# Patient Record
Sex: MALE | Race: WHITE | NOT HISPANIC OR LATINO | Employment: OTHER | URBAN - METROPOLITAN AREA
[De-identification: names, ages, dates, MRNs, and addresses within clinical notes are randomized per-mention and may not be internally consistent; named-entity substitution may affect disease eponyms.]

---

## 2021-06-02 ENCOUNTER — RECORDS - HEALTHEAST (OUTPATIENT)
Dept: ADMINISTRATIVE | Facility: CLINIC | Age: 81
End: 2021-06-02

## 2022-03-28 ENCOUNTER — TRANSCRIBE ORDERS (OUTPATIENT)
Dept: OTHER | Age: 82
End: 2022-03-28

## 2022-03-28 DIAGNOSIS — I50.9 CONGESTIVE HEART FAILURE, UNSPECIFIED HF CHRONICITY, UNSPECIFIED HEART FAILURE TYPE (H): Primary | ICD-10-CM

## 2023-03-29 ENCOUNTER — APPOINTMENT (OUTPATIENT)
Dept: GENERAL RADIOLOGY | Facility: CLINIC | Age: 83
DRG: 521 | End: 2023-03-29
Attending: EMERGENCY MEDICINE
Payer: COMMERCIAL

## 2023-03-29 ENCOUNTER — HOSPITAL ENCOUNTER (INPATIENT)
Facility: CLINIC | Age: 83
LOS: 2 days | Discharge: HOME | DRG: 521 | End: 2023-03-31
Attending: EMERGENCY MEDICINE | Admitting: FAMILY MEDICINE
Payer: COMMERCIAL

## 2023-03-29 ENCOUNTER — APPOINTMENT (OUTPATIENT)
Dept: GENERAL RADIOLOGY | Facility: CLINIC | Age: 83
DRG: 521 | End: 2023-03-29
Attending: FAMILY MEDICINE
Payer: COMMERCIAL
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
## 2023-03-29 DIAGNOSIS — S72.001A CLOSED DISPLACED FRACTURE OF RIGHT FEMORAL NECK (H): ICD-10-CM

## 2023-03-29 PROBLEM — I42.8 NICM (NONISCHEMIC CARDIOMYOPATHY) (H): Status: ACTIVE | Noted: 2022-01-10

## 2023-03-29 PROBLEM — I48.0 PAROXYSMAL ATRIAL FIBRILLATION WITH RAPID VENTRICULAR RESPONSE (H): Status: ACTIVE | Noted: 2020-07-31

## 2023-03-29 PROBLEM — I77.810 ASCENDING AORTA DILATATION (H): Status: ACTIVE | Noted: 2020-06-23

## 2023-03-29 LAB
ANION GAP SERPL CALCULATED.3IONS-SCNC: 12 MMOL/L (ref 7–15)
ANION GAP SERPL CALCULATED.3IONS-SCNC: 12 MMOL/L (ref 7–15)
BASOPHILS # BLD AUTO: 0 10E3/UL (ref 0–0.2)
BASOPHILS # BLD AUTO: 0 10E3/UL (ref 0–0.2)
BASOPHILS NFR BLD AUTO: 0 %
BASOPHILS NFR BLD AUTO: 0 %
BUN SERPL-MCNC: 34.3 MG/DL (ref 8–23)
BUN SERPL-MCNC: 34.5 MG/DL (ref 8–23)
CALCIUM SERPL-MCNC: 9.2 MG/DL (ref 8.8–10.2)
CALCIUM SERPL-MCNC: 9.4 MG/DL (ref 8.8–10.2)
CHLORIDE SERPL-SCNC: 104 MMOL/L (ref 98–107)
CHLORIDE SERPL-SCNC: 107 MMOL/L (ref 98–107)
CREAT SERPL-MCNC: 1.7 MG/DL (ref 0.67–1.17)
CREAT SERPL-MCNC: 1.8 MG/DL (ref 0.67–1.17)
DEPRECATED HCO3 PLAS-SCNC: 21 MMOL/L (ref 22–29)
DEPRECATED HCO3 PLAS-SCNC: 24 MMOL/L (ref 22–29)
EOSINOPHIL # BLD AUTO: 0.1 10E3/UL (ref 0–0.7)
EOSINOPHIL # BLD AUTO: 0.1 10E3/UL (ref 0–0.7)
EOSINOPHIL NFR BLD AUTO: 1 %
EOSINOPHIL NFR BLD AUTO: 1 %
ERYTHROCYTE [DISTWIDTH] IN BLOOD BY AUTOMATED COUNT: 14.1 % (ref 10–15)
ERYTHROCYTE [DISTWIDTH] IN BLOOD BY AUTOMATED COUNT: 14.2 % (ref 10–15)
GFR SERPL CREATININE-BSD FRML MDRD: 38 ML/MIN/1.73M2
GFR SERPL CREATININE-BSD FRML MDRD: 40 ML/MIN/1.73M2
GLUCOSE SERPL-MCNC: 106 MG/DL (ref 70–99)
GLUCOSE SERPL-MCNC: 114 MG/DL (ref 70–99)
HCT VFR BLD AUTO: 47.5 % (ref 40–53)
HCT VFR BLD AUTO: 49.4 % (ref 40–53)
HGB BLD-MCNC: 16.1 G/DL (ref 13.3–17.7)
HGB BLD-MCNC: 16.5 G/DL (ref 13.3–17.7)
IMM GRANULOCYTES # BLD: 0 10E3/UL
IMM GRANULOCYTES # BLD: 0.1 10E3/UL
IMM GRANULOCYTES NFR BLD: 0 %
IMM GRANULOCYTES NFR BLD: 1 %
INR PPP: 1.19 (ref 0.85–1.15)
LYMPHOCYTES # BLD AUTO: 0.8 10E3/UL (ref 0.8–5.3)
LYMPHOCYTES # BLD AUTO: 1 10E3/UL (ref 0.8–5.3)
LYMPHOCYTES NFR BLD AUTO: 10 %
LYMPHOCYTES NFR BLD AUTO: 8 %
MCH RBC QN AUTO: 30.6 PG (ref 26.5–33)
MCH RBC QN AUTO: 30.6 PG (ref 26.5–33)
MCHC RBC AUTO-ENTMCNC: 33.4 G/DL (ref 31.5–36.5)
MCHC RBC AUTO-ENTMCNC: 33.9 G/DL (ref 31.5–36.5)
MCV RBC AUTO: 90 FL (ref 78–100)
MCV RBC AUTO: 92 FL (ref 78–100)
MONOCYTES # BLD AUTO: 0.6 10E3/UL (ref 0–1.3)
MONOCYTES # BLD AUTO: 0.7 10E3/UL (ref 0–1.3)
MONOCYTES NFR BLD AUTO: 6 %
MONOCYTES NFR BLD AUTO: 7 %
NEUTROPHILS # BLD AUTO: 8.5 10E3/UL (ref 1.6–8.3)
NEUTROPHILS # BLD AUTO: 8.7 10E3/UL (ref 1.6–8.3)
NEUTROPHILS NFR BLD AUTO: 82 %
NEUTROPHILS NFR BLD AUTO: 84 %
NRBC # BLD AUTO: 0 10E3/UL
NRBC # BLD AUTO: 0 10E3/UL
NRBC BLD AUTO-RTO: 0 /100
NRBC BLD AUTO-RTO: 0 /100
PLATELET # BLD AUTO: 129 10E3/UL (ref 150–450)
PLATELET # BLD AUTO: 130 10E3/UL (ref 150–450)
POTASSIUM SERPL-SCNC: 4.8 MMOL/L (ref 3.4–5.3)
POTASSIUM SERPL-SCNC: 5 MMOL/L (ref 3.4–5.3)
RBC # BLD AUTO: 5.27 10E6/UL (ref 4.4–5.9)
RBC # BLD AUTO: 5.39 10E6/UL (ref 4.4–5.9)
SODIUM SERPL-SCNC: 140 MMOL/L (ref 136–145)
SODIUM SERPL-SCNC: 140 MMOL/L (ref 136–145)
WBC # BLD AUTO: 10.3 10E3/UL (ref 4–11)
WBC # BLD AUTO: 10.3 10E3/UL (ref 4–11)

## 2023-03-29 PROCEDURE — 85610 PROTHROMBIN TIME: CPT | Performed by: EMERGENCY MEDICINE

## 2023-03-29 PROCEDURE — 99285 EMERGENCY DEPT VISIT HI MDM: CPT | Performed by: EMERGENCY MEDICINE

## 2023-03-29 PROCEDURE — 71045 X-RAY EXAM CHEST 1 VIEW: CPT

## 2023-03-29 PROCEDURE — 36415 COLL VENOUS BLD VENIPUNCTURE: CPT | Performed by: EMERGENCY MEDICINE

## 2023-03-29 PROCEDURE — 99285 EMERGENCY DEPT VISIT HI MDM: CPT | Mod: 25 | Performed by: EMERGENCY MEDICINE

## 2023-03-29 PROCEDURE — 120N000001 HC R&B MED SURG/OB

## 2023-03-29 PROCEDURE — 258N000003 HC RX IP 258 OP 636: Performed by: FAMILY MEDICINE

## 2023-03-29 PROCEDURE — 73502 X-RAY EXAM HIP UNI 2-3 VIEWS: CPT

## 2023-03-29 PROCEDURE — 80048 BASIC METABOLIC PNL TOTAL CA: CPT | Performed by: EMERGENCY MEDICINE

## 2023-03-29 PROCEDURE — 250N000013 HC RX MED GY IP 250 OP 250 PS 637: Performed by: FAMILY MEDICINE

## 2023-03-29 PROCEDURE — 85004 AUTOMATED DIFF WBC COUNT: CPT | Performed by: EMERGENCY MEDICINE

## 2023-03-29 PROCEDURE — 96375 TX/PRO/DX INJ NEW DRUG ADDON: CPT | Performed by: EMERGENCY MEDICINE

## 2023-03-29 PROCEDURE — 250N000011 HC RX IP 250 OP 636: Performed by: EMERGENCY MEDICINE

## 2023-03-29 PROCEDURE — 96374 THER/PROPH/DIAG INJ IV PUSH: CPT | Performed by: EMERGENCY MEDICINE

## 2023-03-29 PROCEDURE — 99222 1ST HOSP IP/OBS MODERATE 55: CPT | Mod: AI | Performed by: FAMILY MEDICINE

## 2023-03-29 RX ORDER — NALOXONE HYDROCHLORIDE 0.4 MG/ML
0.4 INJECTION, SOLUTION INTRAMUSCULAR; INTRAVENOUS; SUBCUTANEOUS
Status: DISCONTINUED | OUTPATIENT
Start: 2023-03-29 | End: 2023-03-31 | Stop reason: HOSPADM

## 2023-03-29 RX ORDER — ONDANSETRON 2 MG/ML
4 INJECTION INTRAMUSCULAR; INTRAVENOUS EVERY 6 HOURS PRN
Status: DISCONTINUED | OUTPATIENT
Start: 2023-03-29 | End: 2023-03-30

## 2023-03-29 RX ORDER — ONDANSETRON 4 MG/1
4 TABLET, ORALLY DISINTEGRATING ORAL EVERY 6 HOURS PRN
Status: DISCONTINUED | OUTPATIENT
Start: 2023-03-29 | End: 2023-03-30

## 2023-03-29 RX ORDER — METOPROLOL SUCCINATE 25 MG/1
1 TABLET, EXTENDED RELEASE ORAL DAILY
COMMUNITY
Start: 2023-01-27

## 2023-03-29 RX ORDER — HYDROMORPHONE HYDROCHLORIDE 1 MG/ML
.3-.5 INJECTION, SOLUTION INTRAMUSCULAR; INTRAVENOUS; SUBCUTANEOUS
Status: DISCONTINUED | OUTPATIENT
Start: 2023-03-29 | End: 2023-03-30 | Stop reason: DRUGHIGH

## 2023-03-29 RX ORDER — CYCLOBENZAPRINE HCL 5 MG
5 TABLET ORAL 3 TIMES DAILY PRN
Status: DISCONTINUED | OUTPATIENT
Start: 2023-03-29 | End: 2023-03-31 | Stop reason: HOSPADM

## 2023-03-29 RX ORDER — MULTIVITAMIN WITH IRON
250 TABLET ORAL DAILY
COMMUNITY

## 2023-03-29 RX ORDER — MULTIVITAMIN,THERAPEUTIC
1 TABLET ORAL DAILY
Status: DISCONTINUED | OUTPATIENT
Start: 2023-03-30 | End: 2023-03-31 | Stop reason: HOSPADM

## 2023-03-29 RX ORDER — NALOXONE HYDROCHLORIDE 0.4 MG/ML
0.2 INJECTION, SOLUTION INTRAMUSCULAR; INTRAVENOUS; SUBCUTANEOUS
Status: DISCONTINUED | OUTPATIENT
Start: 2023-03-29 | End: 2023-03-31 | Stop reason: HOSPADM

## 2023-03-29 RX ORDER — ACETAMINOPHEN 325 MG/1
650 TABLET ORAL EVERY 6 HOURS PRN
Status: DISCONTINUED | OUTPATIENT
Start: 2023-03-29 | End: 2023-03-30 | Stop reason: DRUGHIGH

## 2023-03-29 RX ORDER — LEVOCARNITINE 1 G/10ML
500 SOLUTION ORAL DAILY
Status: DISCONTINUED | OUTPATIENT
Start: 2023-03-30 | End: 2023-03-31 | Stop reason: HOSPADM

## 2023-03-29 RX ORDER — DIAZEPAM 10 MG/2ML
2.5 INJECTION, SOLUTION INTRAMUSCULAR; INTRAVENOUS ONCE
Status: COMPLETED | OUTPATIENT
Start: 2023-03-29 | End: 2023-03-29

## 2023-03-29 RX ORDER — SACUBITRIL AND VALSARTAN 24; 26 MG/1; MG/1
1 TABLET, FILM COATED ORAL 2 TIMES DAILY
COMMUNITY
Start: 2023-02-27

## 2023-03-29 RX ORDER — SODIUM CHLORIDE 9 MG/ML
INJECTION, SOLUTION INTRAVENOUS CONTINUOUS
Status: DISCONTINUED | OUTPATIENT
Start: 2023-03-29 | End: 2023-03-30

## 2023-03-29 RX ORDER — METOPROLOL SUCCINATE 25 MG/1
25 TABLET, EXTENDED RELEASE ORAL DAILY
Status: DISCONTINUED | OUTPATIENT
Start: 2023-03-30 | End: 2023-03-31 | Stop reason: HOSPADM

## 2023-03-29 RX ORDER — LIDOCAINE 40 MG/G
CREAM TOPICAL
Status: DISCONTINUED | OUTPATIENT
Start: 2023-03-29 | End: 2023-03-30

## 2023-03-29 RX ORDER — HYDROMORPHONE HYDROCHLORIDE 1 MG/ML
0.3 INJECTION, SOLUTION INTRAMUSCULAR; INTRAVENOUS; SUBCUTANEOUS ONCE
Status: COMPLETED | OUTPATIENT
Start: 2023-03-29 | End: 2023-03-29

## 2023-03-29 RX ADMIN — ACETAMINOPHEN 650 MG: 325 TABLET, FILM COATED ORAL at 23:13

## 2023-03-29 RX ADMIN — HYDROMORPHONE HYDROCHLORIDE 0.3 MG: 1 INJECTION, SOLUTION INTRAMUSCULAR; INTRAVENOUS; SUBCUTANEOUS at 19:39

## 2023-03-29 RX ADMIN — HYDROMORPHONE HYDROCHLORIDE 0.5 MG: 1 INJECTION, SOLUTION INTRAMUSCULAR; INTRAVENOUS; SUBCUTANEOUS at 21:32

## 2023-03-29 RX ADMIN — DIAZEPAM 2.5 MG: 5 INJECTION, SOLUTION INTRAMUSCULAR; INTRAVENOUS at 18:09

## 2023-03-29 RX ADMIN — CYCLOBENZAPRINE HYDROCHLORIDE 5 MG: 5 TABLET, FILM COATED ORAL at 23:13

## 2023-03-29 RX ADMIN — SODIUM CHLORIDE: 9 INJECTION, SOLUTION INTRAVENOUS at 23:13

## 2023-03-29 RX ADMIN — SACUBITRIL AND VALSARTAN 1 TABLET: 24; 26 TABLET, FILM COATED ORAL at 23:13

## 2023-03-29 ASSESSMENT — ACTIVITIES OF DAILY LIVING (ADL)
ADLS_ACUITY_SCORE: 39
CHANGE_IN_FUNCTIONAL_STATUS_SINCE_ONSET_OF_CURRENT_ILLNESS/INJURY: NO
TRANSFERRING: 1-->ASSISTANCE (EQUIPMENT/PERSON) NEEDED
NUMBER_OF_TIMES_PATIENT_HAS_FALLEN_WITHIN_LAST_SIX_MONTHS: 1
ADLS_ACUITY_SCORE: 39
ADLS_ACUITY_SCORE: 26
DIFFICULTY_EATING/SWALLOWING: NO
WEAR_GLASSES_OR_BLIND: NO
WALKING_OR_CLIMBING_STAIRS: AMBULATION DIFFICULTY, REQUIRES EQUIPMENT
ADLS_ACUITY_SCORE: 39
EQUIPMENT_CURRENTLY_USED_AT_HOME: CANE, QUAD
TRANSFERRING: 1-->ASSISTANCE (EQUIPMENT/PERSON) NEEDED (NOT DEVELOPMENTALLY APPROPRIATE)
TOILETING_ISSUES: NO
FALL_HISTORY_WITHIN_LAST_SIX_MONTHS: YES
CONCENTRATING,_REMEMBERING_OR_MAKING_DECISIONS_DIFFICULTY: NO
DOING_ERRANDS_INDEPENDENTLY_DIFFICULTY: NO
WALKING_OR_CLIMBING_STAIRS_DIFFICULTY: YES
DRESSING/BATHING_DIFFICULTY: NO

## 2023-03-29 NOTE — ED PROVIDER NOTES
History     Chief Complaint   Patient presents with     Hip Pain     Fall     Fall at [employer]; Hx of A fib didn't hit head. On blood thinner. R hip fx?     HPI  Liset Franklin is a 82 year old male with past medical history significant for CHF nonischemic cardiomyopathy A-fib hypertension and CKD who presents emergency department complaining of right hip pain status post fall.  Patient was trying to tie her shoe at a department store when he lost his balance and fell landing on his right hip.  Had significant pain at this time.  He did not hit his head did not lose consciousness.  Denies other injury denies neck pain midline back pain chest pain shortness of breath abdominal pain has significant pain with any movements of the right hip.  Denies any focal numbness weakness in extremity did not have any bowel or bladder dysfunction.    Allergies:  No Known Allergies    Problem List:    There are no problems to display for this patient.       Past Medical History:    No past medical history on file.    Past Surgical History:    No past surgical history on file.    Family History:    No family history on file.    Social History:  Marital Status:   [2]        Medications:    ASPIRIN 325 MG OR TABS  CENTRUM SILVER OR TABS  DILTIAZEM CD CP24# 240 MG OR  METOPROLOL SUCCINATE# 50 MG OR TB24  PROPAFENONE  MG OR TABS          Review of Systems  All systems reviewed and other than pertinent positives and negatives in HPI all other systems are negative.  Physical Exam   BP: (!) 148/81  Pulse: 61  Temp: 97.6  F (36.4  C)  Resp: 20  SpO2: 95 %      Physical Exam  Vitals and nursing note reviewed.   Constitutional:       General: He is not in acute distress.     Appearance: He is not ill-appearing, toxic-appearing or diaphoretic.   HENT:      Head: Normocephalic.      Nose: Nose normal.      Mouth/Throat:      Mouth: Mucous membranes are moist.      Pharynx: Oropharynx is clear.   Eyes:      Conjunctiva/sclera:  Conjunctivae normal.   Neck:      Comments: There is no posterior neck tenderness.  Cardiovascular:      Rate and Rhythm: Normal rate and regular rhythm.      Pulses: Normal pulses.      Heart sounds: Normal heart sounds. No murmur heard.  Pulmonary:      Effort: Pulmonary effort is normal.      Breath sounds: Normal breath sounds. No stridor. No wheezing or rhonchi.   Abdominal:      General: Abdomen is flat. Bowel sounds are normal. There is no distension.      Palpations: Abdomen is soft.      Tenderness: There is no abdominal tenderness. There is no right CVA tenderness or left CVA tenderness.   Musculoskeletal:      Cervical back: Normal range of motion and neck supple.      Comments: There is no midline back tenderness.  There is tenderness palpation of the right hip and posterior hip.  Leg is mildly shortened.  No significant rotation is noted pulses sensation symmetrical patient has significant pain with any movement of the right hip no calf pain good plantarflexion dorsiflexion ankles.   Skin:     General: Skin is warm and dry.      Capillary Refill: Capillary refill takes less than 2 seconds.      Findings: No rash.   Neurological:      General: No focal deficit present.      Mental Status: He is alert and oriented to person, place, and time.      Sensory: No sensory deficit.      Motor: No weakness.      Coordination: Coordination normal.   Psychiatric:         Mood and Affect: Mood normal.         ED Course                 Procedures              Critical Care time:  none               Results for orders placed or performed during the hospital encounter of 03/29/23 (from the past 24 hour(s))   CBC with platelets differential    Narrative    The following orders were created for panel order CBC with platelets differential.  Procedure                               Abnormality         Status                     ---------                               -----------         ------                     CBC with  platelets and d...[[id number]]  Abnormal            Final result                 Please view results for these tests on the individual orders.   Basic metabolic panel   Result Value Ref Range    Sodium 140 136 - 145 mmol/L    Potassium 5.0 3.4 - 5.3 mmol/L    Chloride 107 98 - 107 mmol/L    Carbon Dioxide (CO2) 21 (L) 22 - 29 mmol/L    Anion Gap 12 7 - 15 mmol/L    Urea Nitrogen 34.3 (H) 8.0 - 23.0 mg/dL    Creatinine 1.70 (H) 0.67 - 1.17 mg/dL    Calcium 9.2 8.8 - 10.2 mg/dL    Glucose 106 (H) 70 - 99 mg/dL    GFR Estimate 40 (L) >60 mL/min/1.73m2   INR   Result Value Ref Range    INR 1.19 (H) 0.85 - 1.15   CBC with platelets and differential   Result Value Ref Range    WBC Count 10.3 4.0 - 11.0 10e3/uL    RBC Count 5.27 4.40 - 5.90 10e6/uL    Hemoglobin 16.1 13.3 - 17.7 g/dL    Hematocrit 47.5 40.0 - 53.0 %    MCV 90 78 - 100 fL    MCH 30.6 26.5 - 33.0 pg    MCHC 33.9 31.5 - 36.5 g/dL    RDW 14.1 10.0 - 15.0 %    Platelet Count 130 (L) 150 - 450 10e3/uL    % Neutrophils 84 %    % Lymphocytes 8 %    % Monocytes 6 %    % Eosinophils 1 %    % Basophils 0 %    % Immature Granulocytes 1 %    NRBCs per 100 WBC 0 <1 /100    Absolute Neutrophils 8.7 (H) 1.6 - 8.3 10e3/uL    Absolute Lymphocytes 0.8 0.8 - 5.3 10e3/uL    Absolute Monocytes 0.6 0.0 - 1.3 10e3/uL    Absolute Eosinophils 0.1 0.0 - 0.7 10e3/uL    Absolute Basophils 0.0 0.0 - 0.2 10e3/uL    Absolute Immature Granulocytes 0.1 <=0.4 10e3/uL    Absolute NRBCs 0.0 10e3/uL   XR Pelvis w Hip Right 1 View    Narrative    EXAM: XR PELVIS AND HIP RIGHT 1 VIEW  LOCATION: [facility]  DATE/TIME: 3/29/2023 6:44 PM    INDICATION: Right hip pain.  COMPARISON: None.      Impression    IMPRESSION: Acute mid cervical right femoral neck fracture. Normal hip joint alignment. Mild joint space narrowing in both hips without significant arthritic changes. Advanced degenerative changes in the lower lumbar spine and SI joints.       Medications - No data to  display    Assessments & Plan (with Medical Decision Making) records were reviewed.  Past medical history and medication list was reviewed.  Recent cardiac follow-up was reviewed.  Patient has been followed for recent pacemaker placement 4 months ago.  Basic labs were obtained.  Patient did not had any dizziness or chest pain prior to fall and just tripped.  CBC significant for a platelet count of 130.  Otherwise unremarkable.  Basic metabolic panel with a creatinine of 1.70 BUN was 34 carbon dioxide 21.  Last creatinine was 1.65 with a BUN of 28.  This was done March 17.  This was found on [facility] laboratory list.  His INR was 1.19.  Pelvic x-ray and right hip x-ray were obtained.  These images were reviewed.  They revealed an acute mid cervical right femoral neck fracture.  Normal hip joint alignment with joint space narrowing in both hips without significant arthritic changes.  Findings were discussed in detail with Dr. Dunlap [place] orthopedic physician he stated that since patient took Xarelto last night they may be able to operate in the afternoon tomorrow.  Findings discussed with patient and wife they are in agreement with admission.  Discussed the case with Dr. Андрей Woo with hospitalist service.  He is in agreement with admitting the patient for further evaluation and care.  Discussed case with anesthesia for possible block but they cannot do blocks on patient's on Xarelto until 72 hours after last dose.     I have reviewed the nursing notes.    I have reviewed the findings, diagnosis, plan and need for follow up with the patient.           New Prescriptions    No medications on file       Final diagnoses:   Fall, initial encounter   Closed displaced fracture of right femoral neck (H)       3/29/2023   [facility] EMERGENCY DEPT     Nelson Vanegas MD  03/29/23 0425

## 2023-03-29 NOTE — LETTER
Transition Communication Hand-off for Care Transitions to Next Level of Care Provider    Name: Liset Franklin  : 1940  MRN #: [id number]  Primary Care Provider: Lokesh Tomas     Primary Clinic: [facility] [address] [place] [address]     Reason for Hospitalization:  Fall, initial encounter [W19.XXXA]  Closed displaced fracture of right femoral neck (H) [S72.001A]  Admit Date/Time: 3/29/2023  4:37 PM  Discharge Date: 3/31  Payor Source: Payor: [employer] / Plan: [employer] MEDICARE ADVANTAGE / Product Type: Medicare /             Reason for Communication Hand-off Referral: Discharge with home care    Discharge Plan:  Home with [facility] (Phone: [phone] Fax: [phone])       Concern for non-adherence with plan of care:   Y/N no  Discharge Needs Assessment:  Needs    Flowsheet Row Most Recent Value   Equipment Currently Used at Home cane, straight   # of Referrals Placed by [facility] External Care Coordination, Homecare        Follow-up plan:  No future appointments.    Any outstanding tests or procedures:        Referrals     Future Labs/Procedures    Home Care Referral           Supplies     Future Labs/Procedures    [facility] D[facility]     Process Instructions:    By signing this order, the Authorizing Provider is attesting that they have completed a face-to-face evaluation on the patient to determine their need for this equipment in the last 60 days. A new face-to-face evaluation is required each time  A new prescription for one of the specified items is ordered.   If an additional provider completed the evaluation, please indicate their name below.     **As of 2018, an order requisition and face sheet will print for all DME orders. Please give printed order and face sheet to patient if not obtaining product from [facility] DME closet.     Comments:    DME Documentation: Describe the reason for need to support medical necessity: Impaired gait status post hip surgery. I, the  undersigned, certify that the above prescribed supplies are medically necessary for this patient and is both reasonable and necessary in reference to accepted standards of medical practice in the treatment of this patient's condition and is not prescribed as a convenience.    [facility]     Process Instructions:    By signing this order, the Authorizing Provider is attesting that they have completed a face-to-face evaluation on the patient to determine their need for this equipment in the last 60 days. A new face-to-face evaluation is required each time  A new prescription for one of the specified items is ordered.   If an additional provider completed the evaluation, please indicate their name below.     **As of 2018, an order requisition and face sheet will print for all DME orders. Please give printed order and face sheet to patient if not obtaining product from [facility] DME closet.     Comments:    : DME Documentation: Describe the reason for need to support medical necessity: Impaired gait status post hip surgery. I, the undersigned, certify that the above prescribed supplies are medically necessary for this patient and is both reasonable and necessary in reference to accepted standards of medical practice in the treatment of this patient's condition and is not prescribed as a convenience.              Dyan Mayer RN    AVS/Discharge Summary is the source of truth; this is a helpful guide for improved communication of patient story

## 2023-03-29 NOTE — ED TRIAGE NOTES
Patient arrives to ED via ambulance. Leaned down to tie his shoe laces and fell onto his right side. Injured right hip with obvious injury. Patient denies LOC, denies head/neck pain. Has a pacemaker and takes Xarelto. Denies numbness/tingling in RLE, CMS intact. A&O x 4, anxious. C/O tightness in right hip.     EMS reports HTN en route. Placed peripheral IV and administered 100 mcg Fentanyl and 4 mg Zofran.      Triage Assessment     Row Name 03/29/23 1893       Triage Assessment (Adult)    Airway WDL WDL       Respiratory WDL    Respiratory WDL WDL       Skin Circulation/Temperature WDL    Skin Circulation/Temperature WDL WDL       Cardiac WDL    Cardiac WDL WDL       Peripheral/Neurovascular WDL    Peripheral Neurovascular WDL WDL       Cognitive/Neuro/Behavioral WDL    Cognitive/Neuro/Behavioral WDL WDL

## 2023-03-30 ENCOUNTER — ANESTHESIA (OUTPATIENT)
Dept: SURGERY | Facility: CLINIC | Age: 83
DRG: 521 | End: 2023-03-30
Payer: COMMERCIAL

## 2023-03-30 ENCOUNTER — ANESTHESIA EVENT (OUTPATIENT)
Dept: SURGERY | Facility: CLINIC | Age: 83
DRG: 521 | End: 2023-03-30
Payer: COMMERCIAL

## 2023-03-30 ENCOUNTER — ANESTHESIA EVENT (OUTPATIENT)
Dept: MEDSURG UNIT | Facility: CLINIC | Age: 83
DRG: 521 | End: 2023-03-30
Payer: COMMERCIAL

## 2023-03-30 ENCOUNTER — APPOINTMENT (OUTPATIENT)
Dept: GENERAL RADIOLOGY | Facility: CLINIC | Age: 83
DRG: 521 | End: 2023-03-30
Attending: ORTHOPAEDIC SURGERY
Payer: COMMERCIAL

## 2023-03-30 ENCOUNTER — ANCILLARY PROCEDURE (OUTPATIENT)
Dept: ULTRASOUND IMAGING | Facility: CLINIC | Age: 83
End: 2023-03-30
Payer: COMMERCIAL

## 2023-03-30 ENCOUNTER — ANESTHESIA (OUTPATIENT)
Dept: MEDSURG UNIT | Facility: CLINIC | Age: 83
DRG: 521 | End: 2023-03-30
Payer: COMMERCIAL

## 2023-03-30 LAB
ANION GAP SERPL CALCULATED.3IONS-SCNC: 10 MMOL/L (ref 7–15)
BUN SERPL-MCNC: 33.6 MG/DL (ref 8–23)
CALCIUM SERPL-MCNC: 8.9 MG/DL (ref 8.8–10.2)
CHLORIDE SERPL-SCNC: 106 MMOL/L (ref 98–107)
CREAT SERPL-MCNC: 1.64 MG/DL (ref 0.67–1.17)
DEPRECATED HCO3 PLAS-SCNC: 24 MMOL/L (ref 22–29)
ERYTHROCYTE [DISTWIDTH] IN BLOOD BY AUTOMATED COUNT: 14.5 % (ref 10–15)
GFR SERPL CREATININE-BSD FRML MDRD: 42 ML/MIN/1.73M2
GLUCOSE BLDC GLUCOMTR-MCNC: 105 MG/DL (ref 70–99)
GLUCOSE SERPL-MCNC: 120 MG/DL (ref 70–99)
HCT VFR BLD AUTO: 47 % (ref 40–53)
HGB BLD-MCNC: 15.4 G/DL (ref 13.3–17.7)
MCH RBC QN AUTO: 30.4 PG (ref 26.5–33)
MCHC RBC AUTO-ENTMCNC: 32.8 G/DL (ref 31.5–36.5)
MCV RBC AUTO: 93 FL (ref 78–100)
PLATELET # BLD AUTO: 120 10E3/UL (ref 150–450)
POTASSIUM SERPL-SCNC: 4.8 MMOL/L (ref 3.4–5.3)
RBC # BLD AUTO: 5.07 10E6/UL (ref 4.4–5.9)
SODIUM SERPL-SCNC: 140 MMOL/L (ref 136–145)
WBC # BLD AUTO: 7.2 10E3/UL (ref 4–11)

## 2023-03-30 PROCEDURE — 250N000011 HC RX IP 250 OP 636: Performed by: FAMILY MEDICINE

## 2023-03-30 PROCEDURE — 250N000011 HC RX IP 250 OP 636: Performed by: ORTHOPAEDIC SURGERY

## 2023-03-30 PROCEDURE — 258N000003 HC RX IP 258 OP 636: Performed by: FAMILY MEDICINE

## 2023-03-30 PROCEDURE — 360N000077 HC SURGERY LEVEL 4, PER MIN: Performed by: ORTHOPAEDIC SURGERY

## 2023-03-30 PROCEDURE — 85027 COMPLETE CBC AUTOMATED: CPT | Performed by: FAMILY MEDICINE

## 2023-03-30 PROCEDURE — 999N000157 HC STATISTIC RCP TIME EA 10 MIN

## 2023-03-30 PROCEDURE — 710N000009 HC RECOVERY PHASE 1, LEVEL 1, PER MIN: Performed by: ORTHOPAEDIC SURGERY

## 2023-03-30 PROCEDURE — C1776 JOINT DEVICE (IMPLANTABLE): HCPCS | Performed by: ORTHOPAEDIC SURGERY

## 2023-03-30 PROCEDURE — 999N000065 XR PELVIS PORT 1/2 VIEWS

## 2023-03-30 PROCEDURE — 370N000017 HC ANESTHESIA TECHNICAL FEE, PER MIN: Performed by: ORTHOPAEDIC SURGERY

## 2023-03-30 PROCEDURE — 0QH634Z INSERTION OF INTERNAL FIXATION DEVICE INTO RIGHT UPPER FEMUR, PERCUTANEOUS APPROACH: ICD-10-PCS | Performed by: ORTHOPAEDIC SURGERY

## 2023-03-30 PROCEDURE — 84132 ASSAY OF SERUM POTASSIUM: CPT | Performed by: FAMILY MEDICINE

## 2023-03-30 PROCEDURE — 93005 ELECTROCARDIOGRAM TRACING: CPT

## 2023-03-30 PROCEDURE — 36415 COLL VENOUS BLD VENIPUNCTURE: CPT | Performed by: FAMILY MEDICINE

## 2023-03-30 PROCEDURE — 250N000011 HC RX IP 250 OP 636: Performed by: NURSE ANESTHETIST, CERTIFIED REGISTERED

## 2023-03-30 PROCEDURE — 250N000013 HC RX MED GY IP 250 OP 250 PS 637: Performed by: NURSE ANESTHETIST, CERTIFIED REGISTERED

## 2023-03-30 PROCEDURE — 272N000001 HC OR GENERAL SUPPLY STERILE: Performed by: ORTHOPAEDIC SURGERY

## 2023-03-30 PROCEDURE — 99232 SBSQ HOSP IP/OBS MODERATE 35: CPT | Performed by: INTERNAL MEDICINE

## 2023-03-30 PROCEDURE — 250N000011 HC RX IP 250 OP 636

## 2023-03-30 PROCEDURE — 250N000013 HC RX MED GY IP 250 OP 250 PS 637: Performed by: INTERNAL MEDICINE

## 2023-03-30 PROCEDURE — 250N000009 HC RX 250: Performed by: ORTHOPAEDIC SURGERY

## 2023-03-30 PROCEDURE — 250N000013 HC RX MED GY IP 250 OP 250 PS 637: Performed by: FAMILY MEDICINE

## 2023-03-30 PROCEDURE — 250N000009 HC RX 250: Performed by: NURSE ANESTHETIST, CERTIFIED REGISTERED

## 2023-03-30 PROCEDURE — 0SR902A REPLACEMENT OF RIGHT HIP JOINT WITH METAL ON POLYETHYLENE SYNTHETIC SUBSTITUTE, UNCEMENTED, OPEN APPROACH: ICD-10-PCS | Performed by: ORTHOPAEDIC SURGERY

## 2023-03-30 PROCEDURE — 120N000001 HC R&B MED SURG/OB

## 2023-03-30 PROCEDURE — 999N000141 HC STATISTIC PRE-PROCEDURE NURSING ASSESSMENT: Performed by: ORTHOPAEDIC SURGERY

## 2023-03-30 PROCEDURE — C1713 ANCHOR/SCREW BN/BN,TIS/BN: HCPCS | Performed by: ORTHOPAEDIC SURGERY

## 2023-03-30 PROCEDURE — 258N000003 HC RX IP 258 OP 636: Performed by: NURSE ANESTHETIST, CERTIFIED REGISTERED

## 2023-03-30 DEVICE — PINNACLE CANCELLOUS BONE SCREW 6.5MM X 30MM
Type: IMPLANTABLE DEVICE | Site: HIP | Status: FUNCTIONAL
Brand: PINNACLE

## 2023-03-30 DEVICE — M-SPEC METAL FEMORAL HEAD 12/14 TAPER DIAMETER 36MM +5: Type: IMPLANTABLE DEVICE | Site: HIP | Status: FUNCTIONAL

## 2023-03-30 DEVICE — PINNACLE CANCELLOUS BONE SCREW 6.5MM X 35MM
Type: IMPLANTABLE DEVICE | Site: HIP | Status: FUNCTIONAL
Brand: PINNACLE

## 2023-03-30 DEVICE — PINNACLE POROCOAT ACETABULAR SHELL SECTOR II 58MM OD
Type: IMPLANTABLE DEVICE | Site: HIP | Status: FUNCTIONAL
Brand: PINNACLE POROCOAT

## 2023-03-30 DEVICE — PINNACLE HIP SOLUTIONS ALTRX POLYETHYLENE ACETABULAR LINER NEUTRAL 36MM ID 58MM OD
Type: IMPLANTABLE DEVICE | Site: HIP | Status: FUNCTIONAL
Brand: PINNACLE ALTRX

## 2023-03-30 DEVICE — ACTIS DUOFIX HIP PROSTHESIS (FEMORAL STEM 12/14 TAPER CEMENTLESS SIZE 7 STD COLLAR)  CE
Type: IMPLANTABLE DEVICE | Site: HIP | Status: FUNCTIONAL
Brand: ACTIS

## 2023-03-30 RX ORDER — PROPOFOL 10 MG/ML
INJECTION, EMULSION INTRAVENOUS CONTINUOUS PRN
Status: DISCONTINUED | OUTPATIENT
Start: 2023-03-30 | End: 2023-03-30

## 2023-03-30 RX ORDER — PROCHLORPERAZINE MALEATE 5 MG
5 TABLET ORAL EVERY 6 HOURS PRN
Status: DISCONTINUED | OUTPATIENT
Start: 2023-03-30 | End: 2023-03-31 | Stop reason: HOSPADM

## 2023-03-30 RX ORDER — GLYCOPYRROLATE 0.2 MG/ML
INJECTION, SOLUTION INTRAMUSCULAR; INTRAVENOUS PRN
Status: DISCONTINUED | OUTPATIENT
Start: 2023-03-30 | End: 2023-03-30

## 2023-03-30 RX ORDER — SODIUM CHLORIDE, SODIUM LACTATE, POTASSIUM CHLORIDE, CALCIUM CHLORIDE 600; 310; 30; 20 MG/100ML; MG/100ML; MG/100ML; MG/100ML
INJECTION, SOLUTION INTRAVENOUS CONTINUOUS
Status: DISCONTINUED | OUTPATIENT
Start: 2023-03-30 | End: 2023-03-30

## 2023-03-30 RX ORDER — AMOXICILLIN 250 MG
1 CAPSULE ORAL 2 TIMES DAILY
Status: DISCONTINUED | OUTPATIENT
Start: 2023-03-30 | End: 2023-03-31 | Stop reason: HOSPADM

## 2023-03-30 RX ORDER — SODIUM CHLORIDE, SODIUM LACTATE, POTASSIUM CHLORIDE, CALCIUM CHLORIDE 600; 310; 30; 20 MG/100ML; MG/100ML; MG/100ML; MG/100ML
INJECTION, SOLUTION INTRAVENOUS CONTINUOUS
Status: DISCONTINUED | OUTPATIENT
Start: 2023-03-30 | End: 2023-03-30 | Stop reason: HOSPADM

## 2023-03-30 RX ORDER — FENTANYL CITRATE 50 UG/ML
50 INJECTION, SOLUTION INTRAMUSCULAR; INTRAVENOUS EVERY 5 MIN PRN
Status: DISCONTINUED | OUTPATIENT
Start: 2023-03-30 | End: 2023-03-30 | Stop reason: HOSPADM

## 2023-03-30 RX ORDER — LIDOCAINE HYDROCHLORIDE 10 MG/ML
INJECTION, SOLUTION INFILTRATION; PERINEURAL PRN
Status: DISCONTINUED | OUTPATIENT
Start: 2023-03-30 | End: 2023-03-30

## 2023-03-30 RX ORDER — HYDROXYZINE HYDROCHLORIDE 50 MG/1
50 TABLET, FILM COATED ORAL EVERY 6 HOURS PRN
Status: DISCONTINUED | OUTPATIENT
Start: 2023-03-30 | End: 2023-03-30 | Stop reason: HOSPADM

## 2023-03-30 RX ORDER — KETOROLAC TROMETHAMINE 15 MG/ML
15 INJECTION, SOLUTION INTRAMUSCULAR; INTRAVENOUS
Status: DISCONTINUED | OUTPATIENT
Start: 2023-03-30 | End: 2023-03-30 | Stop reason: HOSPADM

## 2023-03-30 RX ORDER — SODIUM CHLORIDE, SODIUM LACTATE, POTASSIUM CHLORIDE, CALCIUM CHLORIDE 600; 310; 30; 20 MG/100ML; MG/100ML; MG/100ML; MG/100ML
INJECTION, SOLUTION INTRAVENOUS CONTINUOUS
Status: DISCONTINUED | OUTPATIENT
Start: 2023-03-30 | End: 2023-03-31 | Stop reason: HOSPADM

## 2023-03-30 RX ORDER — OXYCODONE HYDROCHLORIDE 5 MG/1
5 TABLET ORAL EVERY 4 HOURS PRN
Status: DISCONTINUED | OUTPATIENT
Start: 2023-03-30 | End: 2023-03-31 | Stop reason: HOSPADM

## 2023-03-30 RX ORDER — ACETAMINOPHEN 325 MG/1
975 TABLET ORAL EVERY 8 HOURS
Status: DISCONTINUED | OUTPATIENT
Start: 2023-03-30 | End: 2023-03-31 | Stop reason: HOSPADM

## 2023-03-30 RX ORDER — ONDANSETRON 4 MG/1
4 TABLET, ORALLY DISINTEGRATING ORAL EVERY 6 HOURS PRN
Status: DISCONTINUED | OUTPATIENT
Start: 2023-03-30 | End: 2023-03-31 | Stop reason: HOSPADM

## 2023-03-30 RX ORDER — HYDROXYZINE HYDROCHLORIDE 25 MG/1
25 TABLET, FILM COATED ORAL EVERY 6 HOURS PRN
Status: DISCONTINUED | OUTPATIENT
Start: 2023-03-30 | End: 2023-03-30 | Stop reason: DRUGHIGH

## 2023-03-30 RX ORDER — ONDANSETRON 2 MG/ML
4 INJECTION INTRAMUSCULAR; INTRAVENOUS EVERY 6 HOURS PRN
Status: DISCONTINUED | OUTPATIENT
Start: 2023-03-30 | End: 2023-03-31 | Stop reason: HOSPADM

## 2023-03-30 RX ORDER — FENTANYL CITRATE-0.9 % NACL/PF 10 MCG/ML
100-200 PLASTIC BAG, INJECTION (ML) INTRAVENOUS EVERY 5 MIN PRN
Status: DISCONTINUED | OUTPATIENT
Start: 2023-03-30 | End: 2023-03-30 | Stop reason: HOSPADM

## 2023-03-30 RX ORDER — GABAPENTIN 100 MG/1
100 CAPSULE ORAL
Status: COMPLETED | OUTPATIENT
Start: 2023-03-30 | End: 2023-03-30

## 2023-03-30 RX ORDER — FENTANYL CITRATE 50 UG/ML
INJECTION, SOLUTION INTRAMUSCULAR; INTRAVENOUS PRN
Status: DISCONTINUED | OUTPATIENT
Start: 2023-03-30 | End: 2023-03-30

## 2023-03-30 RX ORDER — HYDROMORPHONE HCL IN WATER/PF 6 MG/30 ML
0.1 PATIENT CONTROLLED ANALGESIA SYRINGE INTRAVENOUS
Status: DISCONTINUED | OUTPATIENT
Start: 2023-03-30 | End: 2023-03-31 | Stop reason: HOSPADM

## 2023-03-30 RX ORDER — DEXAMETHASONE SODIUM PHOSPHATE 4 MG/ML
INJECTION, SOLUTION INTRA-ARTICULAR; INTRALESIONAL; INTRAMUSCULAR; INTRAVENOUS; SOFT TISSUE PRN
Status: DISCONTINUED | OUTPATIENT
Start: 2023-03-30 | End: 2023-03-30

## 2023-03-30 RX ORDER — FAMOTIDINE 20 MG/1
20 TABLET, FILM COATED ORAL 2 TIMES DAILY
Status: DISCONTINUED | OUTPATIENT
Start: 2023-03-30 | End: 2023-03-31 | Stop reason: HOSPADM

## 2023-03-30 RX ORDER — HYDROMORPHONE HCL IN WATER/PF 6 MG/30 ML
0.4 PATIENT CONTROLLED ANALGESIA SYRINGE INTRAVENOUS EVERY 5 MIN PRN
Status: DISCONTINUED | OUTPATIENT
Start: 2023-03-30 | End: 2023-03-30 | Stop reason: HOSPADM

## 2023-03-30 RX ORDER — ACETAMINOPHEN 325 MG/1
650 TABLET ORAL EVERY 4 HOURS PRN
Status: DISCONTINUED | OUTPATIENT
Start: 2023-04-02 | End: 2023-03-31 | Stop reason: HOSPADM

## 2023-03-30 RX ORDER — HYDROMORPHONE HCL IN WATER/PF 6 MG/30 ML
0.2 PATIENT CONTROLLED ANALGESIA SYRINGE INTRAVENOUS EVERY 5 MIN PRN
Status: DISCONTINUED | OUTPATIENT
Start: 2023-03-30 | End: 2023-03-30 | Stop reason: HOSPADM

## 2023-03-30 RX ORDER — PROPOFOL 10 MG/ML
INJECTION, EMULSION INTRAVENOUS PRN
Status: DISCONTINUED | OUTPATIENT
Start: 2023-03-30 | End: 2023-03-30

## 2023-03-30 RX ORDER — LIDOCAINE 40 MG/G
CREAM TOPICAL
Status: DISCONTINUED | OUTPATIENT
Start: 2023-03-30 | End: 2023-03-30

## 2023-03-30 RX ORDER — HYDROMORPHONE HCL IN WATER/PF 6 MG/30 ML
0.2 PATIENT CONTROLLED ANALGESIA SYRINGE INTRAVENOUS
Status: DISCONTINUED | OUTPATIENT
Start: 2023-03-30 | End: 2023-03-31 | Stop reason: HOSPADM

## 2023-03-30 RX ORDER — BISACODYL 10 MG
10 SUPPOSITORY, RECTAL RECTAL DAILY PRN
Status: DISCONTINUED | OUTPATIENT
Start: 2023-03-30 | End: 2023-03-31 | Stop reason: HOSPADM

## 2023-03-30 RX ORDER — BUPIVACAINE HYDROCHLORIDE 5 MG/ML
INJECTION, SOLUTION PERINEURAL PRN
Status: DISCONTINUED | OUTPATIENT
Start: 2023-03-30 | End: 2023-03-30 | Stop reason: HOSPADM

## 2023-03-30 RX ORDER — CEFAZOLIN SODIUM/WATER 2 G/20 ML
2 SYRINGE (ML) INTRAVENOUS SEE ADMIN INSTRUCTIONS
Status: DISCONTINUED | OUTPATIENT
Start: 2023-03-30 | End: 2023-03-30 | Stop reason: HOSPADM

## 2023-03-30 RX ORDER — ACETAMINOPHEN 325 MG/1
975 TABLET ORAL ONCE
Status: COMPLETED | OUTPATIENT
Start: 2023-03-30 | End: 2023-03-30

## 2023-03-30 RX ORDER — LIDOCAINE 40 MG/G
CREAM TOPICAL
Status: DISCONTINUED | OUTPATIENT
Start: 2023-03-30 | End: 2023-03-31 | Stop reason: HOSPADM

## 2023-03-30 RX ORDER — TRANEXAMIC ACID 650 MG/1
1950 TABLET ORAL ONCE
Status: COMPLETED | OUTPATIENT
Start: 2023-03-30 | End: 2023-03-30

## 2023-03-30 RX ORDER — ONDANSETRON 4 MG/1
4 TABLET, ORALLY DISINTEGRATING ORAL EVERY 30 MIN PRN
Status: DISCONTINUED | OUTPATIENT
Start: 2023-03-30 | End: 2023-03-30 | Stop reason: HOSPADM

## 2023-03-30 RX ORDER — KETOROLAC TROMETHAMINE 15 MG/ML
15 INJECTION, SOLUTION INTRAMUSCULAR; INTRAVENOUS EVERY 6 HOURS PRN
Status: DISCONTINUED | OUTPATIENT
Start: 2023-03-30 | End: 2023-03-31 | Stop reason: HOSPADM

## 2023-03-30 RX ORDER — POLYETHYLENE GLYCOL 3350 17 G/17G
17 POWDER, FOR SOLUTION ORAL DAILY
Status: DISCONTINUED | OUTPATIENT
Start: 2023-03-31 | End: 2023-03-31 | Stop reason: HOSPADM

## 2023-03-30 RX ORDER — ONDANSETRON 2 MG/ML
4 INJECTION INTRAMUSCULAR; INTRAVENOUS EVERY 30 MIN PRN
Status: DISCONTINUED | OUTPATIENT
Start: 2023-03-30 | End: 2023-03-30 | Stop reason: HOSPADM

## 2023-03-30 RX ORDER — CEFAZOLIN SODIUM/WATER 2 G/20 ML
2 SYRINGE (ML) INTRAVENOUS
Status: DISCONTINUED | OUTPATIENT
Start: 2023-03-30 | End: 2023-03-30 | Stop reason: HOSPADM

## 2023-03-30 RX ORDER — ACETAMINOPHEN 325 MG/1
975 TABLET ORAL ONCE
Status: DISCONTINUED | OUTPATIENT
Start: 2023-03-30 | End: 2023-03-30

## 2023-03-30 RX ORDER — HYDROXYZINE HYDROCHLORIDE 10 MG/1
10 TABLET, FILM COATED ORAL EVERY 6 HOURS PRN
Status: DISCONTINUED | OUTPATIENT
Start: 2023-03-30 | End: 2023-03-31 | Stop reason: HOSPADM

## 2023-03-30 RX ORDER — HYDROXYZINE HYDROCHLORIDE 25 MG/1
25 TABLET, FILM COATED ORAL EVERY 6 HOURS PRN
Status: DISCONTINUED | OUTPATIENT
Start: 2023-03-30 | End: 2023-03-30 | Stop reason: HOSPADM

## 2023-03-30 RX ORDER — ONDANSETRON 2 MG/ML
INJECTION INTRAMUSCULAR; INTRAVENOUS PRN
Status: DISCONTINUED | OUTPATIENT
Start: 2023-03-30 | End: 2023-03-30

## 2023-03-30 RX ORDER — GABAPENTIN 100 MG/1
100 CAPSULE ORAL
Status: DISCONTINUED | OUTPATIENT
Start: 2023-03-30 | End: 2023-03-30

## 2023-03-30 RX ORDER — CALCIUM CARBONATE 500 MG/1
500 TABLET, CHEWABLE ORAL 4 TIMES DAILY PRN
Status: DISCONTINUED | OUTPATIENT
Start: 2023-03-30 | End: 2023-03-31 | Stop reason: HOSPADM

## 2023-03-30 RX ORDER — FENTANYL CITRATE 50 UG/ML
25 INJECTION, SOLUTION INTRAMUSCULAR; INTRAVENOUS EVERY 5 MIN PRN
Status: DISCONTINUED | OUTPATIENT
Start: 2023-03-30 | End: 2023-03-30 | Stop reason: HOSPADM

## 2023-03-30 RX ORDER — CEFAZOLIN SODIUM/WATER 2 G/20 ML
2 SYRINGE (ML) INTRAVENOUS
Status: DISCONTINUED | OUTPATIENT
Start: 2023-03-30 | End: 2023-03-30

## 2023-03-30 RX ORDER — TRANEXAMIC ACID 650 MG/1
1950 TABLET ORAL ONCE
Status: DISCONTINUED | OUTPATIENT
Start: 2023-03-30 | End: 2023-03-30

## 2023-03-30 RX ORDER — CEFAZOLIN SODIUM 1 G/3ML
1 INJECTION, POWDER, FOR SOLUTION INTRAMUSCULAR; INTRAVENOUS EVERY 8 HOURS
Status: COMPLETED | OUTPATIENT
Start: 2023-03-30 | End: 2023-03-31

## 2023-03-30 RX ADMIN — PHENYLEPHRINE HYDROCHLORIDE 200 MCG: 10 INJECTION INTRAVENOUS at 16:43

## 2023-03-30 RX ADMIN — CYCLOBENZAPRINE HYDROCHLORIDE 5 MG: 5 TABLET, FILM COATED ORAL at 11:34

## 2023-03-30 RX ADMIN — PROPOFOL 100 MCG/KG/MIN: 10 INJECTION, EMULSION INTRAVENOUS at 15:34

## 2023-03-30 RX ADMIN — KETOROLAC TROMETHAMINE 15 MG: 15 INJECTION, SOLUTION INTRAMUSCULAR; INTRAVENOUS at 11:33

## 2023-03-30 RX ADMIN — GLYCOPYRROLATE 0.1 MG: 0.2 INJECTION, SOLUTION INTRAMUSCULAR; INTRAVENOUS at 15:34

## 2023-03-30 RX ADMIN — MULTIVITAMIN TABLET 1 TABLET: TABLET at 09:00

## 2023-03-30 RX ADMIN — SODIUM CHLORIDE, POTASSIUM CHLORIDE, SODIUM LACTATE AND CALCIUM CHLORIDE: 600; 310; 30; 20 INJECTION, SOLUTION INTRAVENOUS at 16:52

## 2023-03-30 RX ADMIN — HYDROMORPHONE HYDROCHLORIDE 0.5 MG: 1 INJECTION, SOLUTION INTRAMUSCULAR; INTRAVENOUS; SUBCUTANEOUS at 08:59

## 2023-03-30 RX ADMIN — PHENYLEPHRINE HYDROCHLORIDE 150 MCG: 10 INJECTION INTRAVENOUS at 16:29

## 2023-03-30 RX ADMIN — PHENYLEPHRINE HYDROCHLORIDE 200 MCG: 10 INJECTION INTRAVENOUS at 16:50

## 2023-03-30 RX ADMIN — HYDROMORPHONE HYDROCHLORIDE 0.5 MG: 1 INJECTION, SOLUTION INTRAMUSCULAR; INTRAVENOUS; SUBCUTANEOUS at 15:57

## 2023-03-30 RX ADMIN — PHENYLEPHRINE HYDROCHLORIDE 200 MCG: 10 INJECTION INTRAVENOUS at 15:49

## 2023-03-30 RX ADMIN — HYDROMORPHONE HYDROCHLORIDE 0.5 MG: 1 INJECTION, SOLUTION INTRAMUSCULAR; INTRAVENOUS; SUBCUTANEOUS at 04:30

## 2023-03-30 RX ADMIN — PHENYLEPHRINE HYDROCHLORIDE 100 MCG: 10 INJECTION INTRAVENOUS at 16:32

## 2023-03-30 RX ADMIN — PHENYLEPHRINE HYDROCHLORIDE 150 MCG: 10 INJECTION INTRAVENOUS at 16:19

## 2023-03-30 RX ADMIN — EMPAGLIFLOZIN 10 MG: 10 TABLET, FILM COATED ORAL at 09:02

## 2023-03-30 RX ADMIN — ROCURONIUM BROMIDE 50 MG: 50 INJECTION, SOLUTION INTRAVENOUS at 15:34

## 2023-03-30 RX ADMIN — SODIUM CHLORIDE: 9 INJECTION, SOLUTION INTRAVENOUS at 09:35

## 2023-03-30 RX ADMIN — PHENYLEPHRINE HYDROCHLORIDE 200 MCG: 10 INJECTION INTRAVENOUS at 15:37

## 2023-03-30 RX ADMIN — PHENYLEPHRINE HYDROCHLORIDE 200 MCG: 10 INJECTION INTRAVENOUS at 16:45

## 2023-03-30 RX ADMIN — SUGAMMADEX 200 MG: 100 INJECTION, SOLUTION INTRAVENOUS at 16:46

## 2023-03-30 RX ADMIN — ONDANSETRON 4 MG: 2 INJECTION INTRAMUSCULAR; INTRAVENOUS at 16:46

## 2023-03-30 RX ADMIN — FENTANYL CITRATE 100 MCG: 50 INJECTION, SOLUTION INTRAMUSCULAR; INTRAVENOUS at 15:34

## 2023-03-30 RX ADMIN — SODIUM CHLORIDE, POTASSIUM CHLORIDE, SODIUM LACTATE AND CALCIUM CHLORIDE: 600; 310; 30; 20 INJECTION, SOLUTION INTRAVENOUS at 14:10

## 2023-03-30 RX ADMIN — Medication 2 G: at 15:25

## 2023-03-30 RX ADMIN — PHENYLEPHRINE HYDROCHLORIDE 200 MCG: 10 INJECTION INTRAVENOUS at 16:01

## 2023-03-30 RX ADMIN — PHENYLEPHRINE HYDROCHLORIDE 200 MCG: 10 INJECTION INTRAVENOUS at 16:38

## 2023-03-30 RX ADMIN — CYCLOBENZAPRINE HYDROCHLORIDE 5 MG: 5 TABLET, FILM COATED ORAL at 04:30

## 2023-03-30 RX ADMIN — METOPROLOL SUCCINATE 25 MG: 25 TABLET, EXTENDED RELEASE ORAL at 09:01

## 2023-03-30 RX ADMIN — DEXAMETHASONE SODIUM PHOSPHATE 8 MG: 4 INJECTION, SOLUTION INTRA-ARTICULAR; INTRALESIONAL; INTRAMUSCULAR; INTRAVENOUS; SOFT TISSUE at 15:34

## 2023-03-30 RX ADMIN — MAGNESIUM OXIDE TAB 400 MG (241.3 MG ELEMENTAL MG) 200 MG: 400 (241.3 MG) TAB at 09:00

## 2023-03-30 RX ADMIN — PHENYLEPHRINE HYDROCHLORIDE 200 MCG: 10 INJECTION INTRAVENOUS at 16:55

## 2023-03-30 RX ADMIN — ONDANSETRON 4 MG: 2 INJECTION INTRAMUSCULAR; INTRAVENOUS at 09:17

## 2023-03-30 RX ADMIN — PHENYLEPHRINE HYDROCHLORIDE 150 MCG: 10 INJECTION INTRAVENOUS at 16:07

## 2023-03-30 RX ADMIN — CEFAZOLIN 1 G: 1 INJECTION, POWDER, FOR SOLUTION INTRAMUSCULAR; INTRAVENOUS at 23:42

## 2023-03-30 RX ADMIN — PHENYLEPHRINE HYDROCHLORIDE 200 MCG: 10 INJECTION INTRAVENOUS at 15:40

## 2023-03-30 RX ADMIN — LIDOCAINE HYDROCHLORIDE 50 MG: 10 INJECTION, SOLUTION INFILTRATION; PERINEURAL at 15:34

## 2023-03-30 RX ADMIN — PHENYLEPHRINE HYDROCHLORIDE 150 MCG: 10 INJECTION INTRAVENOUS at 16:25

## 2023-03-30 RX ADMIN — ACETAMINOPHEN 975 MG: 325 TABLET, FILM COATED ORAL at 13:28

## 2023-03-30 RX ADMIN — PHENYLEPHRINE HYDROCHLORIDE 200 MCG: 10 INJECTION INTRAVENOUS at 15:55

## 2023-03-30 RX ADMIN — SACUBITRIL AND VALSARTAN 1 TABLET: 24; 26 TABLET, FILM COATED ORAL at 09:02

## 2023-03-30 RX ADMIN — TRANEXAMIC ACID 1950 MG: 650 TABLET ORAL at 13:28

## 2023-03-30 RX ADMIN — GABAPENTIN 100 MG: 100 CAPSULE ORAL at 13:28

## 2023-03-30 RX ADMIN — HYDROXYZINE HYDROCHLORIDE 25 MG: 25 TABLET, FILM COATED ORAL at 11:34

## 2023-03-30 RX ADMIN — PROPOFOL 100 MG: 10 INJECTION, EMULSION INTRAVENOUS at 15:34

## 2023-03-30 ASSESSMENT — ENCOUNTER SYMPTOMS
DYSRHYTHMIAS: 1
DYSRHYTHMIAS: 1

## 2023-03-30 ASSESSMENT — ACTIVITIES OF DAILY LIVING (ADL)
ADLS_ACUITY_SCORE: 26
ADLS_ACUITY_SCORE: 30
ADLS_ACUITY_SCORE: 30
ADLS_ACUITY_SCORE: 26
ADLS_ACUITY_SCORE: 30
ADLS_ACUITY_SCORE: 30
ADLS_ACUITY_SCORE: 26
ADLS_ACUITY_SCORE: 30
ADLS_ACUITY_SCORE: 30
ADLS_ACUITY_SCORE: 26

## 2023-03-30 NOTE — PROCEDURES
03/30/23    PREOP DIAGNOSIS: Displaced right femoral neck fracture  POSTOP DIAGNOSIS: Displaced right femoral neck fracture  PROCEDURE: Right total hip arthroplasty  SURGEON: Gera Cash   ASSISTANT: Frank Eli.  The presence of a PA was necessary for positioning, retraction, and safe progression of the case  ANESTHESIA: General  EBL: 200cc  COMPLICATIONS: None apparent   DISPO: Stable to PACU     IMPLANTS: DePuy Actis size 7 standard offset collared femur,  36mm x +5mm CoCr femoral head, 36mm x 58mm neutral polyethylene liner, and 58mm Yeaddiss Cup with 6.5mm screws x 2    INDICATIONS: Liset is an 82 year old male who fell and sustained a displaced right femoral neck fracture.  He had a little bit of preexisting leg pain when getting up from a chair and is very active, still mowing his lawn and shoveling his driveway.  Therefore, after discussing treatment options, he and his family elected to proceed with a right LUANN, understanding the risks of infection, damage to vessels or nerves, blood clots, instability, leg length discrepancy, ongoing pain and need for revision surgery.     PROCEDURE: Liset was met in the preoperative holding area where consent was reviewed and the right hip was marked.  He was then transferred to the operating theater. After a general anesthetic was placed - unable to do spinal given recent eliquis use, he was placed in a left lateral decubitus position with his right side up. All bony prominences were padded, he was secured with a Stulberg hip positioner, and an axillary roll was placed.  A timeout was performed verifying the correct patient, surgery, and location. He received preoperative antibiotics as well as transexamic acid. The right lower extremity was then prepped and draped in a standard fashion.     We then made an incision in line with a posterior approach to the hip. Dissection was sharply carried down through skin and subcutaneous tissue, and the gluteus fascia  incised in line with the incision. After palpating and protecting the sciatic nerve, an east west retractor was placed. We then released the piriformis and short external rotators and then performed a T Capsulotomy and debrided fracture hematoma.  We freshened up the neck cut about 6mm proximal to the lesser troch.  We removed the head fragment which measured 53mm and had moderate chondral wear.      We turned our attention to the acetabulum. After placing anterior and posterior retractors, foveal and labral tissue were removed.  We started with a 53mm reamer and sequentially reamed to a 58, in approximately 45 deg of abduction and 30 deg of anteversion. At that point, we had good bleeding bone circumferentially.  A trial cup was placed with good pressfit followed by our final 58mm cup, again with good press fit.  We placed two 6.5mm screws into the ilium and placed a neutral trial liner.  We then turned our attention to the femur. After using a cookie cutter and canal finder, we broached to a size 7, keeping the stem in approximatley 10-15 deg of anteversion. At that point, we had excellent rotational stability.  We then placed a standard offset neck with and a variety of different length femoral heads.  We felt the +5mm head most appropriate which showed leg lengths felt appropriate, the hip was stable in full extension and maximal external rotation, in the sleeping position, and with flexion to 90 degrees and internal rotation to 80 degrees.     We then removed all trial components and thoroughly irrigated the wound. We placed our final size 7 stem.  We impacted our final head. The wound was instilled with a dilute betadine solution. Capsule and short external rotators were repaired with 0-ethibond, gluteus fascia with #1 stratafix, subdermal sutures with 2-0 vicryl, and a running 3-0 subcuticular monocryl. A sterile dressing followed by an abductor pillow was placed and the patient was transferred to the PACU in  stable condition.       POSTOPERATIVE PLAN:   1. WBAT right LE with PT for mobilization  2. DVT prophylaxis: Resume preop eliquis  3. Perioperative antibiotics   4. Follow up in two weeks for a wound check, sooner with questions or concerns    Gera Cash MD

## 2023-03-30 NOTE — ANESTHESIA PROCEDURE NOTES
Airway       Patient location during procedure: OR       Procedure Start/Stop Times: 3/30/2023 3:35 PM  Staff -        CRNA: Kamala Dumont APRN CRNA       Performed By: CRNA  Consent for Airway        Urgency: elective  Indications and Patient Condition       Indications for airway management: yun-procedural and airway protection       Induction type:intravenous       Mask difficulty assessment: 1 - vent by mask    Final Airway Details       Final airway type: endotracheal airway       Successful airway: ETT - single  Endotracheal Airway Details        ETT size (mm): 7.5       Cuffed: yes       Successful intubation technique: video laryngoscopy       VL Blade Size: Gauthier 3       Grade View of Cords: 1       Adjucts: stylet       Position: Right       Measured from: gums/teeth       Secured at (cm): 23       Bite block used: None    Post intubation assessment        Placement verified by: capnometry, equal breath sounds and chest rise        Number of attempts at approach: 1       Number of other approaches attempted: 0       Secured with: silk tape       Ease of procedure: easy       Dentition: Intact and Unchanged    Medication(s) Administered   Medication Administration Time: 3/30/2023 3:35 PM

## 2023-03-30 NOTE — PROGRESS NOTES
[facility]    Hospitalist Progress Note    Date of Service (when I saw the patient): 03/30/2023    Assessment & Plan   Liset Franklin is a 82 year old male who was admitted on 3/29/2023 after a fall at [employer] while trying to tie his shoes, resulting in a right femoral neck fracture.    Closed displaced fracture of right femoral neck (H)  Mechanical fall   Mechanical fall where he lost his balance trying to tie his shoe at [employer] and landed on his right hip with resultant hip fracture.    - pain control with dilaudid prn and flexeril 5 mg 3 times daily prn for spasms   - orthopedics consulted by ER - discussed with Dr. Dunlap  - last dose of xarelto was pm 3/28    - NPO midnight with NS @ 75/h    -Patient undergo surgery at 3 PM this afternoon     Surgery Clearance and RCRI Risk Assessment:   Anesthesia issues: no  Baseline Activity: 4 METS (1 self-care, 4 flight of stairs, >4 heavy house work)  Chest Pain: no  Shortness of breath: no  Cardiac Risk Factors/Assessment:                High Risk Surgery: no (Ex: vascular, open intraperitoneal, intrathoracic)              History Ischemic Heart Disease: no (MI, +stress, nitrate use, current CP thought to be ischemia, ECG with pathological Qs)              History of Congestive Heart Failure: yes              History of CVA: no              Preoperative Treatment with Insulin: no              Preoperative Creatinine greater than 2.0: no              Total Number of Points: 1 = 1.3% risk of major cardiac event  0 = 0.5; 1 = 1.3%; 2 = 3.6%; 3+ = 9.1%.  - We will order ECG; patient reports presence of permanent pacemaker  - CXR demonstrates mild cardiac enlargement with mild pulmonary edema, watch for volume overload.  - No modifiable risk factors, patient at baseline other than oxygen as below.       New oxygen requirement  sats briefly dipped to 88% on room air after pain medications in ER.  No respiratory symptoms including no dyspnea  or chest pain.  On admission sats good on 1LNC.      NICM (nonischemic cardiomyopathy) (H) with pacer  - outside echo from 1/19/23 showed LVEF 35-40%, mild concentric LVH, mildly reduced RV systolic function.  No significant valvular disease.    - continue home Jardiance, metoprolol and entresto   - Mild pulmonary edema on chest x-ray, saline lock IV fluids       CKD (chronic kidney disease) stage 3, GFR 30-59 ml/min (H)  Looks like baseline is about 1.6-1.7, ws 1.8 on admission.  No true CHASITY present on admission.  Gently hydrating as above.  Follow.       Thrombocytopenia  Slightly low on admission - follow.       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  Read as normal on most recent echo fro 1/2023.       Essential hypertension  Continue home metoprolol,      Paroxysmal atrial fibrillation on anticoagulation   - continue home metoprolol   - held xarelto, last dose was approximately dinnertime on 3/28.       Prophylaxis  Patient had recent xarelto day before admission - plan for no anticoagulation pending likely surgery 3/30, resume xarelto post-op pending surgery's recommendations.    - reassess if no surgery 3/30.       DVT Prophylaxis: Defer to orthopedic surgery  Code Status: Full Code    Disposition: Expected discharge in 2-3 days pending postoperative course.    Ruben Mcdaniel MD    Interval History   Patient reports severe pain in right hip with movement.  Pain is controlled at rest.    -Data reviewed today: I reviewed all new labs and imaging results over the last 24 hours. I personally reviewed no images or EKG's today.    Physical Exam   Temp: 97.6  F (36.4  C) Temp src: Oral BP: 121/70 Pulse: 63   Resp: 16 SpO2: 90 % O2 Device: None (Room air) Oxygen Delivery: 1 LPM  Vitals:    03/29/23 2200 03/30/23 0258   Weight: 78.6 kg (173 lb 4.5 oz) 78.8 kg (173 lb 11.6 oz)     Vital Signs with Ranges  Temp:  [97.3  F (36.3  C)-99.1  F  (37.3  C)] 97.6  F (36.4  C)  Pulse:  [60-73] 63  Resp:  [14-20] 16  BP: (112-160)/(68-98) 121/70  SpO2:  [88 %-99 %] 90 %  I/O last 3 completed shifts:  In: -   Out: 250 [Urine:250]    Gen: Debilitated elderly male, alert and oriented x 3, no acute distressed  HEENT: Atraumatic, normocephalic; sclera non-injected, anicterric; oral mucosa moist, no lesion, no exudate  Lungs: Clear to ausculation, no wheezes, no rhonchi, no rales  Heart: Regular rate, regular rhythm, no gallops, no rubs, no murmurs  GI: Bowel sound normal, no hepatosplenomegaly, no masses, non-tender, non-distended, no guarding, no rebound tenderness  Lymph: No lymphadenopathy, no edema  Skin: No rashes, no chronic venous stasis     Medications     lactated ringers       sodium chloride 75 mL/hr at 03/30/23 0935       acetaminophen  975 mg Oral Once     ceFAZolin  2 g Intravenous Pre-Op/Pre-procedure x 1 dose     ceFAZolin  2 g Intravenous See Admin Instructions     empagliflozin  10 mg Oral Daily     gabapentin  100 mg Oral Pre-Op/Pre-procedure x 1 dose     levOCARNitine  500 mg Oral Daily     magnesium oxide  200 mg Oral Daily     metoprolol succinate ER  25 mg Oral Daily     multivitamin, therapeutic  1 tablet Oral Daily     sacubitril-valsartan  1 tablet Oral BID     sodium chloride (PF)  3 mL Intracatheter Q8H     tranexamic acid  1,950 mg Oral Once       Data   Recent Labs   Lab 03/30/23  0502 03/29/23  2137 03/29/23  1817   WBC 7.2 10.3 10.3   HGB 15.4 16.5 16.1   MCV 93 92 90   * 129* 130*   INR  --   --  1.19*    140 140   POTASSIUM 4.8 4.8 5.0   CHLORIDE 106 104 107   CO2 24 24 21*   BUN 33.6* 34.5* 34.3*   CR 1.64* 1.80* 1.70*   ANIONGAP 10 12 12   ELENI 8.9 9.4 9.2   * 114* 106*       Recent Results (from the past 24 hour(s))   XR Pelvis w Hip Right 1 View    Narrative    EXAM: XR PELVIS AND HIP RIGHT 1 VIEW  LOCATION: [facility]  DATE/TIME: 3/29/2023 6:44 PM    INDICATION: Right hip  pain.  COMPARISON: None.      Impression    IMPRESSION: Acute mid cervical right femoral neck fracture. Normal hip joint alignment. Mild joint space narrowing in both hips without significant arthritic changes. Advanced degenerative changes in the lower lumbar spine and SI joints.   XR Chest Port 1 View    Narrative    EXAM: XR CHEST PORT 1 VIEW  LOCATION: [facility]  DATE/TIME: 3/29/2023 10:44 PM    INDICATION: Preop, new O2 requirement.  COMPARISON: Chest x-ray 2 views 10/06/2022 at 1445 hours.      Impression    IMPRESSION: Mild cardiac enlargement. Interval development of mild venous congestion. No consolidation, cavitation, suspicious adenopathy or pleural effusion on either side. Elevation of the right hemidiaphragm, unchanged. Left chest pacer, leads in the   heart, unchanged. Atherosclerotic thoracic aorta. Degenerative changes both shoulders and the spine. Previously seen monitoring leads have been removed.

## 2023-03-30 NOTE — PROGRESS NOTES
[place] [facility] [place]    Patient admitted to room 2309 at approximately 2130 via cart from emergency room. Patient was accompanied by transport tech.     Verbal SBAR report received from ED RN prior to patient arrival.     Patient trasferred to bed via air amber. Patient alert and oriented X 3. Pain is not well controlled.  Medication(s) being used: narcotic analgesics including hydromorphone (Dilaudid).  . Admission vital signs: Blood pressure (!) 148/84, pulse 63, temperature 99.1  F (37.3  C), temperature source Oral, resp. rate 14, SpO2 92 %. Patient was oriented to plan of care, call light, bed controls, tv, telephone, bathroom and visiting hours.     Risk Assessment    The following safety risks were identified during admission: fall and skin. Yellow risk band applied: YES.     Skin Initial Assessment    This writer admitted this patient and completed a full skin assessment and Donnie score in the Adult PCS flowsheet. Appropriate interventions initiated as needed.     Secondary skin check completed by Alisia PÉREZ RN.    Donnie Risk Assessment  Sensory Perception: 4-->no impairment  Moisture: 3-->occasionally moist  Activity: 1-->bedfast  Mobility: 2-->very limited  Nutrition: 3-->adequate  Friction and Shear: 2-->potential problem  Donnie Score: 15  Sensory/Activity/Mobility Interventions: Reassess mattress (bed algorithm), Turn: left/right positioning  Moisture Interventions: Encourage regular toileting, Incontinence pad  Friction/Shear Interventions: HOB 30 degrees or less  Mattress: Standard gel/foam mattress (IsoFlex, Atmos Air, etc.)  Bed Frame: Standard width and length    Education    Patient has a [place] to Observation order: No  Observation education completed and documented: N/A      Cata Cross RN

## 2023-03-30 NOTE — ANESTHESIA POSTPROCEDURE EVALUATION
Patient: Liset Franklni    Procedure: Procedure(s):  Right total hip arthroplasty       Anesthesia Type:  General    Note:  Disposition: Inpatient   Postop Pain Control: Uneventful            Sign Out: Well controlled pain   PONV: No   Neuro/Psych: Uneventful            Sign Out: Acceptable/Baseline neuro status   Airway/Respiratory: Uneventful            Sign Out: Acceptable/Baseline resp. status   CV/Hemodynamics: Uneventful            Sign Out: Acceptable CV status; No obvious hypovolemia; No obvious fluid overload   Other NRE: NONE   DID A NON-ROUTINE EVENT OCCUR? No           Last vitals:  Vitals Value Taken Time   BP 98/69 03/30/23 1815   Temp 36.3  C (97.4  F) 03/30/23 1715   Pulse 61 03/30/23 1817   Resp 14 03/30/23 1817   SpO2 92 % 03/30/23 1817   Vitals shown include unvalidated device data.    Electronically Signed By: ALONDRA Jurado CRNA  March 30, 2023  6:18 PM

## 2023-03-30 NOTE — ANESTHESIA PREPROCEDURE EVALUATION
Anesthesia Pre-Procedure Evaluation    Patient: Liset Franklin   MRN: [id number] : 1940        Procedure : Procedure(s):  HEMIARTHROPLASTY HIP  BIPOLAR          No past medical history on file.   No past surgical history on file.   No Known Allergies   Social History     Tobacco Use     Smoking status: Not on file     Smokeless tobacco: Not on file   Substance Use Topics     Alcohol use: Not on file      Wt Readings from Last 1 Encounters:   23 78.8 kg (173 lb 11.6 oz)        Anesthesia Evaluation   Pt has had prior anesthetic. Type: General and MAC.        ROS/MED HX  ENT/Pulmonary:  - neg pulmonary ROS     Neurologic:  - neg neurologic ROS     Cardiovascular: Comment: Ascending aortic dilation  Nonischemic cardiomyopathy      (+) Dyslipidemia hypertension-----CHF dysrhythmias, a-fib,     METS/Exercise Tolerance:     Hematologic:  - neg hematologic  ROS     Musculoskeletal:   (+) arthritis, fracture, Fracture location: RLE,     GI/Hepatic:  - neg GI/hepatic ROS     Renal/Genitourinary:     (+) renal disease, type: CRI, BPH,     Endo:  - neg endo ROS     Psychiatric/Substance Use:  - neg psychiatric ROS     Infectious Disease:  - neg infectious disease ROS     Malignancy:  - neg malignancy ROS     Other:  - neg other ROS          Physical Exam    Airway        Mallampati: II   TM distance: > 3 FB   Neck ROM: full   Mouth opening: > 3 cm    Respiratory Devices and Support         Dental           Cardiovascular   cardiovascular exam normal          Pulmonary   pulmonary exam normal                OUTSIDE LABS:  CBC:   Lab Results   Component Value Date    WBC 7.2 2023    WBC 10.3 2023    HGB 15.4 2023    HGB 16.5 2023    HCT 47.0 2023    HCT 49.4 2023     (L) 2023     (L) 2023     BMP:   Lab Results   Component Value Date     2023     2023    POTASSIUM 4.8 2023    POTASSIUM 4.8 2023    CHLORIDE 106  03/30/2023    CHLORIDE 104 03/29/2023    CO2 24 03/30/2023    CO2 24 03/29/2023    BUN 33.6 (H) 03/30/2023    BUN 34.5 (H) 03/29/2023    CR 1.64 (H) 03/30/2023    CR 1.80 (H) 03/29/2023     (H) 03/30/2023     (H) 03/29/2023     COAGS:   Lab Results   Component Value Date    PTT 27 06/21/2007    INR 1.19 (H) 03/29/2023     POC: No results found for: BGM, HCG, HCGS  HEPATIC: No results found for: ALBUMIN, PROTTOTAL, ALT, AST, GGT, ALKPHOS, BILITOTAL, BILIDIRECT, GERBER  OTHER:   Lab Results   Component Value Date    ELENI 8.9 03/30/2023    PHOS 3.0 06/21/2007    MAG 2.3 06/21/2007    TSH 0.77 06/21/2007       Anesthesia Plan    ASA Status:  3      Anesthesia Type: Peripheral Nerve Block.              Consents    Anesthesia Plan(s) and associated risks, benefits, and realistic alternatives discussed. Questions answered and patient/representative(s) expressed understanding.     - Discussed: Risks, Benefits and Alternatives for the PROCEDURE were discussed     - Discussed with:  Patient         Postoperative Care    Pain management: IV analgesics, Oral pain medications, Multi-modal analgesia, Peripheral nerve block (Single Shot).   PONV prophylaxis: Ondansetron (or other 5HT-3), Dexamethasone or Solumedrol     Comments:                    ALONDRA Jurado CRNA

## 2023-03-30 NOTE — ED NOTES
[facility]   Admission Handoff    The patient is Liset Franklin, 82 year old who arrived in the ED by AMBULANCE from [facility] with a complaint of Hip Pain and Fall (Fall at [facility]; Hx of A fib didn't hit head. On blood thinner. R hip fx?)  . The patient's current symptoms are new and during this time the symptoms have decreased. In the ED, patient was diagnosed with   Final diagnoses:   Fall, initial encounter   Closed displaced fracture of right femoral neck (H)         Needed?: No    Allergies:  No Known Allergies    Past Medical Hx: No past medical history on file.    Initial vitals were: BP: (!) 148/81  Pulse: 61  Temp: 97.6  F (36.4  C)  Resp: 20  SpO2: 95 %   Recent vital Signs: /76   Pulse 61   Temp 97.6  F (36.4  C) (Oral)   Resp 20   SpO2 97%     Elimination Status: Continent: Yes     Activity Level: Total assist/lift    Fall Status: Reason for falls risk:  Mobility  patient and family education    Baseline Mental status: WDL  Current Mental Status changes: at basesline    Infection present or suspected this encounter: no  Sepsis suspected: No    Isolation type: NA    Bariatric equipment needed?: No    In the ED these meds were given:   Medications   diazepam (VALIUM) injection 2.5 mg (2.5 mg Intravenous $Given 3/29/23 1809)   HYDROmorphone (PF) (DILAUDID) injection 0.3 mg (0.3 mg Intravenous $Given 3/29/23 1939)       Drips running?  No    Home pump  No    Current LDAs: Peripheral IV: See flowsheet    Results:   Labs/Imaging  Ordered and Resulted from Time of ED Arrival Up to the Time of Departure from the ED  Results for orders placed or performed during the hospital encounter of 03/29/23 (from the past 24 hour(s))   CBC with platelets differential    Narrative    The following orders were created for panel order CBC with platelets differential.  Procedure                               Abnormality         Status                     ---------                                -----------         ------                     CBC with platelets and d...[[id number]]  Abnormal            Final result                 Please view results for these tests on the individual orders.   Basic metabolic panel   Result Value Ref Range    Sodium 140 136 - 145 mmol/L    Potassium 5.0 3.4 - 5.3 mmol/L    Chloride 107 98 - 107 mmol/L    Carbon Dioxide (CO2) 21 (L) 22 - 29 mmol/L    Anion Gap 12 7 - 15 mmol/L    Urea Nitrogen 34.3 (H) 8.0 - 23.0 mg/dL    Creatinine 1.70 (H) 0.67 - 1.17 mg/dL    Calcium 9.2 8.8 - 10.2 mg/dL    Glucose 106 (H) 70 - 99 mg/dL    GFR Estimate 40 (L) >60 mL/min/1.73m2   INR   Result Value Ref Range    INR 1.19 (H) 0.85 - 1.15   CBC with platelets and differential   Result Value Ref Range    WBC Count 10.3 4.0 - 11.0 10e3/uL    RBC Count 5.27 4.40 - 5.90 10e6/uL    Hemoglobin 16.1 13.3 - 17.7 g/dL    Hematocrit 47.5 40.0 - 53.0 %    MCV 90 78 - 100 fL    MCH 30.6 26.5 - 33.0 pg    MCHC 33.9 31.5 - 36.5 g/dL    RDW 14.1 10.0 - 15.0 %    Platelet Count 130 (L) 150 - 450 10e3/uL    % Neutrophils 84 %    % Lymphocytes 8 %    % Monocytes 6 %    % Eosinophils 1 %    % Basophils 0 %    % Immature Granulocytes 1 %    NRBCs per 100 WBC 0 <1 /100    Absolute Neutrophils 8.7 (H) 1.6 - 8.3 10e3/uL    Absolute Lymphocytes 0.8 0.8 - 5.3 10e3/uL    Absolute Monocytes 0.6 0.0 - 1.3 10e3/uL    Absolute Eosinophils 0.1 0.0 - 0.7 10e3/uL    Absolute Basophils 0.0 0.0 - 0.2 10e3/uL    Absolute Immature Granulocytes 0.1 <=0.4 10e3/uL    Absolute NRBCs 0.0 10e3/uL   XR Pelvis w Hip Right 1 View    Narrative    EXAM: XR PELVIS AND HIP RIGHT 1 VIEW  LOCATION: [facility]  DATE/TIME: 3/29/2023 6:44 PM    INDICATION: Right hip pain.  COMPARISON: None.      Impression    IMPRESSION: Acute mid cervical right femoral neck fracture. Normal hip joint alignment. Mild joint space narrowing in both hips without significant arthritic changes. Advanced degenerative changes in  the lower lumbar spine and SI joints.       For the majority of the shift this patient's behavior was Green     Cardiac Rhythm: Normal Sinus  Pt needs tele? No  Skin/wound Issues:  right hip fracture    Code Status: Full Code    Pain control: fair    Nausea control: pt had none    Abnormal labs/tests/findings requiring intervention: Occasional low SpO2, placed nasal cannula at 1 lpm    Patient tested for COVID 19 prior to admission: NO     OBS brochure/video discussed/provided to patient/family: N/A     Family present during ED course? Yes     Family Comments/Social Situation comments: Yes, in attendance    Tasks needing completion: None    Jolanta Munoz, MAY

## 2023-03-30 NOTE — INTERVAL H&P NOTE
I have reviewed the surgical (or preoperative) H&P that is linked to this encounter, and examined the patient. There are no significant changes    Clinical Conditions Present on Arrival:  Clinically Significant Risk Factors Present on Admission                  # Drug Induced Coagulation Defect: home medication list includes an anticoagulant medication  # Thrombocytopenia: Lowest platelets = 120 in last 2 days, will monitor for bleeding

## 2023-03-30 NOTE — ANESTHESIA CARE TRANSFER NOTE
Patient: Liset Franklin    Procedure: Procedure(s):  Right total hip arthroplasty       Diagnosis: Closed displaced fracture of right femoral neck (H) [S72.001A]  Diagnosis Additional Information: No value filed.    Anesthesia Type:   General     Note:    Oropharynx: oropharynx clear of all foreign objects  Level of Consciousness: drowsy  Oxygen Supplementation: face mask    Independent Airway: airway patency satisfactory and stable  Dentition: dentition unchanged  Vital Signs Stable: post-procedure vital signs reviewed and stable  Report to RN Given: handoff report given  Patient transferred to: PACU    Handoff Report: Identifed the Patient, Identified the Reponsible Provider, Reviewed the pertinent medical history, Discussed the surgical course, Reviewed Intra-OP anesthesia mangement and issues during anesthesia, Set expectations for post-procedure period and Allowed opportunity for questions and acknowledgement of understanding      Vitals:  Vitals Value Taken Time   BP 85/57 03/30/23 1721   Temp     Pulse 60 03/30/23 1722   Resp 11 03/30/23 1722   SpO2 93 % 03/30/23 1722   Vitals shown include unvalidated device data.    Electronically Signed By: ALONDRA Jurado CRNA  March 30, 2023  5:23 PM

## 2023-03-30 NOTE — H&P
[facility] History and Physical    Dawclayton Franklin MRN# [id number]   Age: 82 year old YOB: 1940     Date of Admission:  3/29/2023      Primary care provider: Lokesh Tomas          Assessment and Plan:   Assessment & Plan       Closed displaced fracture of right femoral neck (H)  Mechanical fall   Mechanical fall where he lost his balance trying to tie his shoe at [employer] and landed on his right hip with resultant hip fracture.    - pain control with dilaudid prn and flexeril 5 mg 3 times daily prn for spasms   - orthopedics consulted by ER - discussed with Dr. Dunlap - sounds like likely plan for surgery pm 3/30   - last dose of xarelto was pm 3/28    - NPO midnight with NS @ 75/h      Surgery Clearance and RCRI Risk Assessment:   Anesthesia issues: no  Baseline Activity: 4 METS (1 self-care, 4 flight of stairs, >4 heavy house work)  Chest Pain: no  Shortness of breath: no  Cardiac Risk Factors/Assessment:                High Risk Surgery: no (Ex: vascular, open intraperitoneal, intrathoracic)              History Ischemic Heart Disease: no (MI, +stress, nitrate use, current CP thought to be ischemia, ECG with pathological Qs)              History of Congestive Heart Failure: yes              History of CVA: no              Preoperative Treatment with Insulin: no              Preoperative Creatinine greater than 2.0: no              Total Number of Points: 1 = 1.3% risk of major cardiac event  0 = 0.5; 1 = 1.3%; 2 = 3.6%; 3+ = 9.1%.    - ECG and chest x-ray pending  - no modifiable risk factors, patient at baseline other than oxygen as below.        New oxygen requirement  sats briefly dipped to 88% on room air after pain medications in ER.  No respiratory symptoms including no dyspnea or chest pain.  On admission sats good on 1LNC.   - will check chest x-ray due to this and as part of preoperative evaluation but suspect just a response to pain medications.    - wean oxygen as able.         NICM (nonischemic cardiomyopathy) (H) with pacer  - outside echo from 1/19/23 showed LVEF 35-40%, mild concentric LVH, mildly reduced RV systolic function.  No significant valvular disease.    - no apparent volume overload on admission, chest x-ray pending as above   - continue home Jardiance, metoprolol and entresto       CKD (chronic kidney disease) stage 3, GFR 30-59 ml/min (H)  Looks like baseline is about 1.6-1.7, ws 1.8 on admission.  No true CHASITY present on admission.  Gently hydrating as above.  Follow.      Thrombocytopenia  Slightly low on admission - follow.        ? 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  Read as normal on most recent echo fro 1/2023.        Essential hypertension  Continue home metoprolol,       Paroxysmal atrial fibrillation on anticoagulation   - continue home metoprolol   - held xarelto, last dose was pm 3/28.            Prophylaxis  Patient had recent xarelto day before admission - plan for no anticoagulation pending likely surgery 3/30, resume xarelto post-op pending surgery's recommendations.    - reassess if no surgery 3/30.      Lines  PIV    Diet  Orders Placed This Encounter      NPO for Medical/Clinical Reasons Except for: Meds      Regular Diet Adult      Clinically Significant Risk Factors Present on Admission               # Drug Induced Coagulation Defect: home medication list includes an anticoagulant medication  # Thrombocytopenia: Lowest platelets = 129 in last 2 days, will monitor for bleeding                   Code Status  Full         Disposition  Anticipate at least 2-3 days inpatient, likely TCU on discharge.                Chief Complaint:   Fall, hip pain   History is obtained from the patient          History of Present Illness:   This patient is a 82 year old  male with a significant past medical history of non-ischemic cardiomyopathy, hypertension, atrial fibrillation on  eliquis, and chronic kidney disease who presents with a fall and hip pain.  Was at baseline and shopping at [employer] when he had to tie  His shoe.  He put his foot up on a stack of charcoal bags and as he finished tying he lost his balance on his one foot and couldn't catch himself and fell landing on his right hip.  No loss of consciousness or other red flags.  Couldn't weight bear due to pain.  No weakness no numbness.    No other recent changes.    Normally walks with a cane usually he says.    Pain currently manageable but very bad with any movement of the leg.      No tobacco, rare alcohol, no illicit drug use.    Lives independently.      No prior trouble with anesthesia or bleeding.   Able to ambulate 1 flight of steps without trouble, sometimes dyspneic if more than that.  No recent chest pain or increase in dyspnea/decrease in activity tolerance.               Past Medical History:   I have reviewed this patient's past medical history.  Patient Active Problem List    Diagnosis Date Noted     Fall, initial encounter 03/29/2023     Priority: Medium     Closed displaced fracture of right femoral neck (H) 03/29/2023     Priority: Medium     NICM (nonischemic cardiomyopathy) (H) 01/10/2022     Priority: Medium     Paroxysmal atrial fibrillation with rapid ventricular response (H) 07/31/2020     Priority: Medium     Ascending aorta dilatation (H) 06/23/2020     Priority: Medium     Formatting of this note might be different from the original.  Noted on TTE 6/2020.       Essential hypertension 07/25/2008     Priority: Medium     CKD (chronic kidney disease) stage 3, GFR 30-59 ml/min (H) 04/08/2008     Priority: Medium     Benign prostatic hyperplasia 06/28/2007     Priority: Medium     Hyperlipidemia 06/28/2007     Priority: Medium              Past Surgical History:   I have reviewed this patient's past surgical history   No past surgical history on file.          Social History:   I have reviewed this patient's  "social history   Social History     Tobacco Use     Smoking status: Not on file     Smokeless tobacco: Not on file   Substance Use Topics     Alcohol use: Not on file             Family History:   No relevant family history           Immunizations:   Immunizations are current          Allergies:   No Known Allergies          Medications:     Medications Prior to Admission   Medication Sig Dispense Refill Last Dose     Acetaminophen 325 MG CAPS Take 2 tablets by mouth every 6 hours as needed   More than a month     CENTRUM SILVER OR TABS 1 TABLET BY MOUTH DAILY   3/29/2023 at am     empagliflozin (JARDIANCE) 10 MG TABS tablet Take 10 mg by mouth daily   3/29/2023 at am     Levocarnitine 500 MG TABS Take 500 mg by mouth daily   3/29/2023 at am     magnesium 250 MG tablet Take 250 mg by mouth daily   3/29/2023 at am     metoprolol succinate ER (TOPROL XL) 25 MG 24 hr tablet Take 1 tablet by mouth daily   3/29/2023 at am     rivaroxaban ANTICOAGULANT (XARELTO) 15 MG TABS tablet Take 15 mg by mouth daily (with dinner)   3/28/2023 at 1800     sacubitril-valsartan (ENTRESTO) 24-26 MG per tablet Take 1 tablet by mouth 2 times daily   3/29/2023 at am             Review of Systems:    ROS: 10 point ROS neg other than the symptoms noted above in the HPI.           Physical Exam:   Blood pressure (!) 148/84, pulse 63, temperature 99.1  F (37.3  C), temperature source Oral, resp. rate 14, height 1.905 m (6' 3\"), weight 78.6 kg (173 lb 4.5 oz), SpO2 92 %.  Temperatures:  Current - Temp: 97.6  F (36.4  C); Max - Temp  Av.6  F (36.4  C)  Min: 97.6  F (36.4  C)  Max: 97.6  F (36.4  C)  Respiration range: Resp  Av  Min: 20  Max: 20  Pulse range: Pulse  Av  Min: 61  Max: 61  Blood pressure range: Systolic (24hrs), Av , Min:122 , Max:148   ; Diastolic (24hrs), Av, Min:76, Max:83    Pulse oximetry range: SpO2  Av.6 %  Min: 88 %  Max: 97 %  No intake or output data in the 24 hours ending 23 " 2018  EXAM:  General: awake and alert, NAD, oriented x 3  Head: normocephalic  Neck: unremarkable, no lymphadenopathy   HEENT: oropharynx pink and moist    Heart: Regular rate and rhythm, no murmurs, rubs, or gallops  Lungs: clear to auscultation bilaterally with good air movement throughout  Abdomen: soft, non-tender, no masses or organomegaly  Extremities: no edema in lower extremities   Skin unremarkable.             Data:     Results for orders placed or performed during the hospital encounter of 03/29/23 (from the past 24 hour(s))   CBC with platelets differential    Narrative    The following orders were created for panel order CBC with platelets differential.  Procedure                               Abnormality         Status                     ---------                               -----------         ------                     CBC with platelets and d...[[id number]]  Abnormal            Final result                 Please view results for these tests on the individual orders.   Basic metabolic panel   Result Value Ref Range    Sodium 140 136 - 145 mmol/L    Potassium 5.0 3.4 - 5.3 mmol/L    Chloride 107 98 - 107 mmol/L    Carbon Dioxide (CO2) 21 (L) 22 - 29 mmol/L    Anion Gap 12 7 - 15 mmol/L    Urea Nitrogen 34.3 (H) 8.0 - 23.0 mg/dL    Creatinine 1.70 (H) 0.67 - 1.17 mg/dL    Calcium 9.2 8.8 - 10.2 mg/dL    Glucose 106 (H) 70 - 99 mg/dL    GFR Estimate 40 (L) >60 mL/min/1.73m2   INR   Result Value Ref Range    INR 1.19 (H) 0.85 - 1.15   CBC with platelets and differential   Result Value Ref Range    WBC Count 10.3 4.0 - 11.0 10e3/uL    RBC Count 5.27 4.40 - 5.90 10e6/uL    Hemoglobin 16.1 13.3 - 17.7 g/dL    Hematocrit 47.5 40.0 - 53.0 %    MCV 90 78 - 100 fL    MCH 30.6 26.5 - 33.0 pg    MCHC 33.9 31.5 - 36.5 g/dL    RDW 14.1 10.0 - 15.0 %    Platelet Count 130 (L) 150 - 450 10e3/uL    % Neutrophils 84 %    % Lymphocytes 8 %    % Monocytes 6 %    % Eosinophils 1 %    % Basophils 0 %    % Immature  Granulocytes 1 %    NRBCs per 100 WBC 0 <1 /100    Absolute Neutrophils 8.7 (H) 1.6 - 8.3 10e3/uL    Absolute Lymphocytes 0.8 0.8 - 5.3 10e3/uL    Absolute Monocytes 0.6 0.0 - 1.3 10e3/uL    Absolute Eosinophils 0.1 0.0 - 0.7 10e3/uL    Absolute Basophils 0.0 0.0 - 0.2 10e3/uL    Absolute Immature Granulocytes 0.1 <=0.4 10e3/uL    Absolute NRBCs 0.0 10e3/uL   XR Pelvis w Hip Right 1 View    Narrative    EXAM: XR PELVIS AND HIP RIGHT 1 VIEW  LOCATION: [facility]  DATE/TIME: 3/29/2023 6:44 PM    INDICATION: Right hip pain.  COMPARISON: None.      Impression    IMPRESSION: Acute mid cervical right femoral neck fracture. Normal hip joint alignment. Mild joint space narrowing in both hips without significant arthritic changes. Advanced degenerative changes in the lower lumbar spine and SI joints.   CBC with platelets differential    Narrative    The following orders were created for panel order CBC with platelets differential.  Procedure                               Abnormality         Status                     ---------                               -----------         ------                     CBC with platelets and d...[[id number]]  Abnormal            Final result                 Please view results for these tests on the individual orders.   Basic metabolic panel   Result Value Ref Range    Sodium 140 136 - 145 mmol/L    Potassium 4.8 3.4 - 5.3 mmol/L    Chloride 104 98 - 107 mmol/L    Carbon Dioxide (CO2) 24 22 - 29 mmol/L    Anion Gap 12 7 - 15 mmol/L    Urea Nitrogen 34.5 (H) 8.0 - 23.0 mg/dL    Creatinine 1.80 (H) 0.67 - 1.17 mg/dL    Calcium 9.4 8.8 - 10.2 mg/dL    Glucose 114 (H) 70 - 99 mg/dL    GFR Estimate 38 (L) >60 mL/min/1.73m2   CBC with platelets and differential   Result Value Ref Range    WBC Count 10.3 4.0 - 11.0 10e3/uL    RBC Count 5.39 4.40 - 5.90 10e6/uL    Hemoglobin 16.5 13.3 - 17.7 g/dL    Hematocrit 49.4 40.0 - 53.0 %    MCV 92 78 - 100 fL    MCH 30.6 26.5 -  33.0 pg    MCHC 33.4 31.5 - 36.5 g/dL    RDW 14.2 10.0 - 15.0 %    Platelet Count 129 (L) 150 - 450 10e3/uL    % Neutrophils 82 %    % Lymphocytes 10 %    % Monocytes 7 %    % Eosinophils 1 %    % Basophils 0 %    % Immature Granulocytes 0 %    NRBCs per 100 WBC 0 <1 /100    Absolute Neutrophils 8.5 (H) 1.6 - 8.3 10e3/uL    Absolute Lymphocytes 1.0 0.8 - 5.3 10e3/uL    Absolute Monocytes 0.7 0.0 - 1.3 10e3/uL    Absolute Eosinophils 0.1 0.0 - 0.7 10e3/uL    Absolute Basophils 0.0 0.0 - 0.2 10e3/uL    Absolute Immature Granulocytes 0.0 <=0.4 10e3/uL    Absolute NRBCs 0.0 10e3/uL     All cardiac studies reviewed by me.  All imaging studies reviewed by me.    Attestation:  I have reviewed today's vital signs, notes, medications, labs and imaging.  Amount of time performed on this history and physical: 70 minutes.        Андрей Woo MD

## 2023-03-30 NOTE — PROGRESS NOTES
Alert and oriented, [place]. Bedrest. NPO since 0000. Experience sharp spasms in R leg, PRN flexaril, tylenol and IV dilaudid given. On 1L NC. VSS.    Planning for surgery this AM.

## 2023-03-30 NOTE — ANESTHESIA PREPROCEDURE EVALUATION
Anesthesia Pre-Procedure Evaluation    Patient: Liset Franklin   MRN: [id number] : 1940        Procedure : Procedure(s):  HEMIARTHROPLASTY HIP  BIPOLAR          No past medical history on file.   No past surgical history on file.   No Known Allergies   Social History     Tobacco Use     Smoking status: Not on file     Smokeless tobacco: Not on file   Substance Use Topics     Alcohol use: Not on file      Wt Readings from Last 1 Encounters:   23 78.8 kg (173 lb 11.6 oz)        Anesthesia Evaluation   Pt has had prior anesthetic. Type: General and MAC.        ROS/MED HX  ENT/Pulmonary:  - neg pulmonary ROS     Neurologic:  - neg neurologic ROS     Cardiovascular: Comment: Ascending aortic dilation  Nonischemic cardiomyopathy    EF 35-40%      (+) Dyslipidemia hypertension-----Taking blood thinners CHF pacemaker, Reason placed: A-fib, complete heart block. type: [employer]tronic, settings: VVI, - Patient is dependent on pacemaker. dysrhythmias, a-fib, Previous cardiac testing   Echo: Date: Results:    Stress Test: Date: Results:    ECG Reviewed: Date: 3/30/23 Results:  Electronic ventricular pacemaker   Pacemaker ECG, No further analysis   High Noise (> 200uV jose) in leads V1   INSUFFICIENT DATA  Cath: Date: Results:      METS/Exercise Tolerance:     Hematologic: Comments: aCt last taken 3/28/23 @ 1800      Musculoskeletal:   (+) arthritis, fracture, Fracture location: RLE,     GI/Hepatic:  - neg GI/hepatic ROS     Renal/Genitourinary:     (+) renal disease, type: CRI, BPH,     Endo:  - neg endo ROS     Psychiatric/Substance Use:  - neg psychiatric ROS     Infectious Disease:  - neg infectious disease ROS     Malignancy:  - neg malignancy ROS     Other:  - neg other ROS          Physical Exam    Airway        Mallampati: II   TM distance: > 3 FB   Neck ROM: full   Mouth opening: > 3 cm    Respiratory Devices and Support         Dental       (+) Completely normal teeth      Cardiovascular   cardiovascular  exam normal          Pulmonary   pulmonary exam normal                OUTSIDE LABS:  CBC:   Lab Results   Component Value Date    WBC 7.2 03/30/2023    WBC 10.3 03/29/2023    HGB 15.4 03/30/2023    HGB 16.5 03/29/2023    HCT 47.0 03/30/2023    HCT 49.4 03/29/2023     (L) 03/30/2023     (L) 03/29/2023     BMP:   Lab Results   Component Value Date     03/30/2023     03/29/2023    POTASSIUM 4.8 03/30/2023    POTASSIUM 4.8 03/29/2023    CHLORIDE 106 03/30/2023    CHLORIDE 104 03/29/2023    CO2 24 03/30/2023    CO2 24 03/29/2023    BUN 33.6 (H) 03/30/2023    BUN 34.5 (H) 03/29/2023    CR 1.64 (H) 03/30/2023    CR 1.80 (H) 03/29/2023     (H) 03/30/2023     (H) 03/29/2023     COAGS:   Lab Results   Component Value Date    PTT 27 06/21/2007    INR 1.19 (H) 03/29/2023     POC: No results found for: BGM, HCG, HCGS  HEPATIC: No results found for: ALBUMIN, PROTTOTAL, ALT, AST, GGT, ALKPHOS, BILITOTAL, BILIDIRECT, GERBER  OTHER:   Lab Results   Component Value Date    ELENI 8.9 03/30/2023    PHOS 3.0 06/21/2007    MAG 2.3 06/21/2007    TSH 0.77 06/21/2007       Anesthesia Plan    ASA Status:  3      Anesthesia Type: General.     - Airway: ETT   Induction: Propofol.   Maintenance: Balanced.        Consents    Anesthesia Plan(s) and associated risks, benefits, and realistic alternatives discussed. Questions answered and patient/representative(s) expressed understanding.     - Discussed: Risks, Benefits and Alternatives for BOTH SEDATION and the PROCEDURE were discussed     - Discussed with:  Patient, Spouse         Postoperative Care    Pain management: IV analgesics, Oral pain medications, Multi-modal analgesia.   PONV prophylaxis: Ondansetron (or other 5HT-3), Dexamethasone or Solumedrol     Comments:                ALONDRA Jurado CRNA

## 2023-03-30 NOTE — CONSULTS
[facility] Consultation    Consultation - [facility]  Level of consult: Consult, follow and place orders    Liset Acosta,  1940, MRN [id number]     Admitting Dx: Fall, initial encounter [W19.XXXA]  Closed displaced fracture of right femoral neck (H) [S72.001A]     PCP: Lokesh Tomas, [phone]     Code status:  Full Code     Extended Emergency Contact Information  Primary Emergency Contact: RHODA ACOSTA  Home Phone: [phone]  Mobile Phone: [phone]  Relation: Spouse  Secondary Emergency Contact: EKATERINA HAILE  Mobile Phone: [phone]  Relation: Daughter     Assessment:  Right femoral neck fracture    Plan:  This patient was discussed with Dr.Erik Cash, orthopedic surgeon for [facility] and they are in agreement with the following plan.   Recommend a right bipolar hemiarthroplasty for reduction and pain control.   Discussed with pt and spouse who are in agreement with proceeding.   Scheduled for  with .   Continue with NPO, bedrest.   Pain control prn, discussed with RN. Pt was unable to have a nerve block as he had taken Xarelto on 3/28.     Thank you for including [facility] in the care of Liset Acosta. It has been a pleasure participating in his care.      Rick Wood PA-C  [facility]    Principal Problem:    Closed displaced fracture of right femoral neck (H)  Active Problems:    Ascending aorta dilatation (H)    Benign prostatic hyperplasia    CKD (chronic kidney disease) stage 3, GFR 30-59 ml/min (H)    Essential hypertension    Hyperlipidemia    NICM (nonischemic cardiomyopathy) (H)    Paroxysmal atrial fibrillation with rapid ventricular response (H)    Fall, initial encounter       Chief Complaint  Right hip pain      HPI  The patient is seen in orthopedic consultation at the request of Dr.Kevin Woo.  The patient is a 82 year old male with moderate pain of the right  hip. The patient has  a history of non-ischemic cardiomyopathy with pacer, CKD stage 3, thrombocytopenia, HTN and atrial fibrillation on Xarelto. He is [place]. On 3/29 he was at [employer] and put his foot up on a stack of bags to tie his shoe; he then lost his balance and fell on his right hip. He had immediate pain and couldn't bear weight. He did not hit his head or injure anything else in the fall. He was brought to the [facility] ED where xrays showed a right femoral neck fracture and he was admitted for further cares. He last took Xarelto on 3/28. Currently he reports ongoing pain in the right hip that increases with movement. He denies any other areas of pain, denies numbness or tingling in the RLE.      History is obtained from the patient and electronic health record     Past Medical History  No past medical history on file.    Surgical History  No past surgical history on file.     Social History  Social History     Socioeconomic History     Marital status:      Spouse name: Not on file     Number of children: Not on file     Years of education: Not on file     Highest education level: Not on file   Occupational History     Not on file   Tobacco Use     Smoking status: Not on file     Smokeless tobacco: Not on file   Substance and Sexual Activity     Alcohol use: Not on file     Drug use: Not on file     Sexual activity: Not on file   Other Topics Concern     Not on file   Social History Narrative     Not on file     Social Determinants of Health     Financial Resource Strain: Not on file   Food Insecurity: Not on file   Transportation Needs: Not on file   Physical Activity: Not on file   Stress: Not on file   Social Connections: Not on file   Intimate Partner Violence: Not on file   Housing Stability: Not on file       Family History  No family history on file.     Allergies:  Patient has no known allergies.      Current Medications:  Current Facility-Administered Medications   Medication     acetaminophen (TYLENOL)  tablet 650 mg     acetaminophen (TYLENOL) tablet 975 mg     ceFAZolin Sodium (ANCEF) injection 2 g     ceFAZolin Sodium (ANCEF) injection 2 g     cyclobenzaprine (FLEXERIL) tablet 5 mg     empagliflozin (JARDIANCE) tablet 10 mg     gabapentin (NEURONTIN) capsule 100 mg     HYDROmorphone (PF) (DILAUDID) injection 0.3-0.5 mg     hydrOXYzine (ATARAX) tablet 25 mg     ketorolac (TORADOL) injection 15 mg     levOCARNitine (CARNITOR) solution 500 mg     lidocaine (LMX4) kit     lidocaine 1 % 0.1-1 mL     magnesium oxide (MAG-OX) half-tab 200 mg     melatonin tablet 1 mg     metoprolol succinate ER (TOPROL XL) 24 hr tablet 25 mg     multivitamin, therapeutic (THERA-VIT) tablet 1 tablet     naloxone (NARCAN) injection 0.2 mg    Or     naloxone (NARCAN) injection 0.4 mg    Or     naloxone (NARCAN) injection 0.2 mg    Or     naloxone (NARCAN) injection 0.4 mg     ondansetron (ZOFRAN ODT) ODT tab 4 mg    Or     ondansetron (ZOFRAN) injection 4 mg     sacubitril-valsartan (ENTRESTO) 24-26 MG per tablet 1 tablet     sodium chloride (PF) 0.9% PF flush 3 mL     sodium chloride (PF) 0.9% PF flush 3 mL     sodium chloride (PF) 0.9% PF flush 3 mL     tranexamic acid (LYSTEDA) tablet 1,950 mg       Review of Systems:  See H&P    Physical Exam:  Temp:  [97.3  F (36.3  C)-99.1  F (37.3  C)] 97.6  F (36.4  C)  Pulse:  [60-73] 63  Resp:  [14-20] 16  BP: (112-160)/(68-98) 121/70  SpO2:  [88 %-99 %] 90 %    General: On examination, the patient is resting comfortably, NAD, awake and alert and oriented to person, place, time, and and general circumstances  SKIN: Right hip is externally rotated. No obvious deformity or open wound at the right hip.   Pulses:  dorsalis pedis pulse is intact and equal bilaterally  Sensation: intact and equal bilaterally to the distal lower extremities.  Tenderness: Mild TTP over the right lateral hip.   ROM: FROM of the right toes and ankle. Further right hip ROM deferred due to fracture.     Pertinent Labs  Lab  Results: personally reviewed.  Lab Results   Component Value Date    WBC 7.2 03/30/2023    HGB 15.4 03/30/2023    HCT 47.0 03/30/2023    MCV 93 03/30/2023     (L) 03/30/2023     Recent Labs   Lab 03/29/23 1817   INR 1.19*       Pertinent Radiology  Radiology Results: images and radiology report reviewed  Recent Results (from the past 24 hour(s))   XR Pelvis w Hip Right 1 View    Narrative    EXAM: XR PELVIS AND HIP RIGHT 1 VIEW  LOCATION: [facility]  DATE/TIME: 3/29/2023 6:44 PM    INDICATION: Right hip pain.  COMPARISON: None.      Impression    IMPRESSION: Acute mid cervical right femoral neck fracture. Normal hip joint alignment. Mild joint space narrowing in both hips without significant arthritic changes. Advanced degenerative changes in the lower lumbar spine and SI joints.                          Attestation:  I have reviewed today's vital signs, notes, medications, labs and imaging.     Rick Wood PA-C

## 2023-03-31 ENCOUNTER — APPOINTMENT (OUTPATIENT)
Dept: PHYSICAL THERAPY | Facility: CLINIC | Age: 83
DRG: 521 | End: 2023-03-31
Payer: COMMERCIAL

## 2023-03-31 ENCOUNTER — APPOINTMENT (OUTPATIENT)
Dept: OCCUPATIONAL THERAPY | Facility: CLINIC | Age: 83
DRG: 521 | End: 2023-03-31
Payer: COMMERCIAL

## 2023-03-31 VITALS
RESPIRATION RATE: 17 BRPM | HEART RATE: 61 BPM | HEIGHT: 75 IN | OXYGEN SATURATION: 91 % | SYSTOLIC BLOOD PRESSURE: 104 MMHG | BODY MASS INDEX: 21.51 KG/M2 | WEIGHT: 173 LBS | DIASTOLIC BLOOD PRESSURE: 64 MMHG | TEMPERATURE: 97.7 F

## 2023-03-31 LAB
ANION GAP SERPL CALCULATED.3IONS-SCNC: 17 MMOL/L (ref 7–15)
BUN SERPL-MCNC: 37.8 MG/DL (ref 8–23)
CALCIUM SERPL-MCNC: 9 MG/DL (ref 8.8–10.2)
CHLORIDE SERPL-SCNC: 105 MMOL/L (ref 98–107)
CREAT SERPL-MCNC: 1.82 MG/DL (ref 0.67–1.17)
DEPRECATED HCO3 PLAS-SCNC: 18 MMOL/L (ref 22–29)
ERYTHROCYTE [DISTWIDTH] IN BLOOD BY AUTOMATED COUNT: 14.5 % (ref 10–15)
GFR SERPL CREATININE-BSD FRML MDRD: 37 ML/MIN/1.73M2
GLUCOSE SERPL-MCNC: 121 MG/DL (ref 70–99)
HCT VFR BLD AUTO: 48.3 % (ref 40–53)
HGB BLD-MCNC: 15.7 G/DL (ref 13.3–17.7)
MCH RBC QN AUTO: 30.9 PG (ref 26.5–33)
MCHC RBC AUTO-ENTMCNC: 32.5 G/DL (ref 31.5–36.5)
MCV RBC AUTO: 95 FL (ref 78–100)
PLATELET # BLD AUTO: 129 10E3/UL (ref 150–450)
POTASSIUM SERPL-SCNC: 5 MMOL/L (ref 3.4–5.3)
RBC # BLD AUTO: 5.08 10E6/UL (ref 4.4–5.9)
SODIUM SERPL-SCNC: 140 MMOL/L (ref 136–145)
WBC # BLD AUTO: 10.1 10E3/UL (ref 4–11)

## 2023-03-31 PROCEDURE — 99239 HOSP IP/OBS DSCHRG MGMT >30: CPT | Performed by: INTERNAL MEDICINE

## 2023-03-31 PROCEDURE — 36415 COLL VENOUS BLD VENIPUNCTURE: CPT | Performed by: ORTHOPAEDIC SURGERY

## 2023-03-31 PROCEDURE — 250N000011 HC RX IP 250 OP 636: Performed by: ORTHOPAEDIC SURGERY

## 2023-03-31 PROCEDURE — 97116 GAIT TRAINING THERAPY: CPT | Mod: GP

## 2023-03-31 PROCEDURE — 85027 COMPLETE CBC AUTOMATED: CPT | Performed by: ORTHOPAEDIC SURGERY

## 2023-03-31 PROCEDURE — 80048 BASIC METABOLIC PNL TOTAL CA: CPT | Performed by: ORTHOPAEDIC SURGERY

## 2023-03-31 PROCEDURE — 250N000013 HC RX MED GY IP 250 OP 250 PS 637: Performed by: ORTHOPAEDIC SURGERY

## 2023-03-31 PROCEDURE — 97530 THERAPEUTIC ACTIVITIES: CPT | Mod: GP

## 2023-03-31 PROCEDURE — 97161 PT EVAL LOW COMPLEX 20 MIN: CPT | Mod: GP

## 2023-03-31 RX ADMIN — FAMOTIDINE 20 MG: 20 TABLET, FILM COATED ORAL at 08:04

## 2023-03-31 RX ADMIN — LEVOCARNITINE 500 MG: 1 SOLUTION ORAL at 08:06

## 2023-03-31 RX ADMIN — FAMOTIDINE 20 MG: 10 INJECTION INTRAVENOUS at 01:23

## 2023-03-31 RX ADMIN — EMPAGLIFLOZIN 10 MG: 10 TABLET, FILM COATED ORAL at 08:07

## 2023-03-31 RX ADMIN — ACETAMINOPHEN 975 MG: 325 TABLET, FILM COATED ORAL at 04:43

## 2023-03-31 RX ADMIN — MAGNESIUM OXIDE TAB 400 MG (241.3 MG ELEMENTAL MG) 200 MG: 400 (241.3 MG) TAB at 08:07

## 2023-03-31 RX ADMIN — METOPROLOL SUCCINATE 25 MG: 25 TABLET, EXTENDED RELEASE ORAL at 08:04

## 2023-03-31 RX ADMIN — ACETAMINOPHEN 975 MG: 325 TABLET, FILM COATED ORAL at 13:45

## 2023-03-31 RX ADMIN — MULTIVITAMIN TABLET 1 TABLET: TABLET at 08:04

## 2023-03-31 RX ADMIN — CEFAZOLIN 1 G: 1 INJECTION, POWDER, FOR SOLUTION INTRAMUSCULAR; INTRAVENOUS at 08:00

## 2023-03-31 RX ADMIN — SACUBITRIL AND VALSARTAN 1 TABLET: 24; 26 TABLET, FILM COATED ORAL at 08:06

## 2023-03-31 ASSESSMENT — ACTIVITIES OF DAILY LIVING (ADL)
ADLS_ACUITY_SCORE: 27
ADLS_ACUITY_SCORE: 28
DEPENDENT_IADLS:: INDEPENDENT
ADLS_ACUITY_SCORE: 30
ADLS_ACUITY_SCORE: 27
ADLS_ACUITY_SCORE: 27
ADLS_ACUITY_SCORE: 30
ADLS_ACUITY_SCORE: 28
ADLS_ACUITY_SCORE: 30

## 2023-03-31 NOTE — PROVIDER NOTIFICATION
"Message sent to Dr. Garcia at 1105 \"Would you like us to give this patient his Entresto? His last BP was 94/62 and HR 61. Pt not getting IV fluids d/t pulmonary edema on chest Xray. \"    "

## 2023-03-31 NOTE — CONSULTS
Care Management Initial Consult    General Information  Assessment completed with: Patient Liset and spouse Ermelinda  Type of CM/SW Visit: Offer D/C Planning    Primary Care Provider verified and updated as needed: Yes   Readmission within the last 30 days: no previous admission in last 30 days         Advance Care Planning: Advance Care Planning Reviewed: no concerns identified          Communication Assessment  Patient's communication style: spoken language (English or Bilingual)    Hearing Difficulty or Deaf: yes   Wear Glasses or Blind: no    Cognitive  Cognitive/Neuro/Behavioral: WDL  Level of Consciousness: alert  Arousal Level: opens eyes spontaneously                Living Environment:   People in home: spouse     Current living Arrangements: house      Able to return to prior arrangements: yes       Family/Social Support:  Care provided by: self  Provides care for: no one  Marital Status:   Wife, Children  Ermelinda       Description of Support System: Supportive, Involved    Support Assessment: Adequate family and caregiver support, Adequate social supports    Current Resources:   Patient receiving home care services: No  Community Resources: None  Equipment currently used at home: cane, straight  Supplies currently used at home: None    Employment/Financial:  Employment Status: retired        Financial Concerns: No concerns identified           Lifestyle & Psychosocial Needs:  Social Determinants of Health     Tobacco Use: Not on file   Alcohol Use: Not on file   Financial Resource Strain: Not on file   Food Insecurity: Not on file   Transportation Needs: Not on file   Physical Activity: Not on file   Stress: Not on file   Social Connections: Not on file   Intimate Partner Violence: Not on file   Depression: Not on file   Housing Stability: Not on file       Functional Status:  Prior to admission patient needed assistance:   Dependent ADLs:: Independent, Ambulation-cane  Dependent IADLs::  Independent  Assesssment of Functional Status: Not at baseline with mobility    Mental Health Status:  Mental Health Status: No Current Concerns       Chemical Dependency Status:  Chemical Dependency Status: No Current Concerns             Values/Beliefs:  Spiritual, Cultural Beliefs, Rastafarian Practices, Values that affect care: no               Additional Information:    Met with the Pt and wife Ermelinda for discharge planning due to a fall.  The Pt lives with Ermelinda independently in the community in a 3 level house.  He is independent with ADLs and IADLs.  He ambulates with a cane.  He drives. His children are available to assist if needed.    Discussed home care.  Home Care referral placed.  The Pt has been accepted by [facility] (Phone: [phone] Fax: [phone]) PT and OT.    [facility].    Plan:  Home with [facility] PT and OT.       Dyan Mayer RN

## 2023-03-31 NOTE — PLAN OF CARE
Physical Therapy Discharge Summary    Reason for therapy discharge:    Discharged to home with home therapy.    Progress towards therapy goal(s). See goals on Care Plan in [facility] electronic health record for goal details.  Goals partially met.  Barriers to achieving goals:   discharge on same date as initial evaluation.    Therapy recommendation(s):    Continued therapy is recommended.  Rationale/Recommendations:  Recommend continued home care PT to increase indep and safety in own environment.

## 2023-03-31 NOTE — PROGRESS NOTES
[place] [facility] DISCHARGE NOTE    Patient discharged to home at 3:08 PM via wheel chair. Accompanied by spouse, son and daughter and staff. Discharge instructions reviewed with patient and spouse, opportunity offered to ask questions. Prescriptions sent to patients preferred pharmacy. All belongings sent with patient.    Kamilla Patel

## 2023-03-31 NOTE — DISCHARGE SUMMARY
[facility]  Hospitalist Discharge Summary       Date of Admission:  3/29/2023  Date of Discharge:  3/31/2023  3:12 PM  Discharging Provider: Ruben Mcdaniel MD      Discharge Diagnoses     Closed displaced fracture of right femoral neck (H)  Mechanical fall    Surgery Clearance and RCRI Risk Assessment:   Acute hypoxic respiratory failure   NICM (nonischemic cardiomyopathy) (H) with permanent pacemaker  Acute on chronic systolic heart failure  CKD (chronic kidney disease) stage 3, GFR 30-59 ml/min (H)  Thrombocytopenia  Ascending aorta dilatation (H)  Essential hypertension  Paroxysmal atrial fibrillation on anticoagulation     Follow-ups Needed After Discharge   Follow-up Appointments     Follow-up and recommended labs and tests      Follow up with primary care provider, Lokesh Tomas, within 7 days for   hospital follow- up.  The following labs/tests are recommended: BMP and   CBC.         Follow-up and recommended labs and tests       Follow up with Dr.Erik Cash's team for your right hip replacement at   2 weeks post surgery. Call [facility] scheduling at   [phone] to make an appointment. Call his team at [phone] for   questions.           Discharge Disposition   Discharged to home  Condition at discharge: Stable    Hospital Course   Liset Franklin is a 82 year old male who was admitted on 3/29/2023 after a fall at [employer] while trying to tie his shoes, resulting in a right femoral neck fracture.    Closed displaced fracture of right femoral neck (H)  Mechanical fall   Mechanical fall where he lost his balance trying to tie his shoe at [employer] and landed on his right hip with resultant hip fracture.    - pain control with dilaudid prn and flexeril 5 mg 3 times daily prn for spasms   - orthopedics consulted by ER - discussed with Dr. Dunlap  - last dose of xarelto prior to surgery was pm 3/28    - Patient underwent right total hip arthroplasty  -  Pain was reasonably well controlled and he was ambulating in the halls with therapy  - Patient to discharge to home     Surgery Clearance and RCRI Risk Assessment:   Anesthesia issues: no  Baseline Activity: 4 METS (1 self-care, 4 flight of stairs, >4 heavy house work)  Chest Pain: no  Shortness of breath: no  Cardiac Risk Factors/Assessment:                High Risk Surgery: no (Ex: vascular, open intraperitoneal, intrathoracic)              History Ischemic Heart Disease: no (MI, +stress, nitrate use, current CP thought to be ischemia, ECG with pathological Qs)              History of Congestive Heart Failure: yes              History of CVA: no              Preoperative Treatment with Insulin: no              Preoperative Creatinine greater than 2.0: no              Total Number of Points: 1 = 1.3% risk of major cardiac event  0 = 0.5; 1 = 1.3%; 2 = 3.6%; 3+ = 9.1%.  - We will order ECG; patient reports presence of permanent pacemaker  - CXR demonstrates mild cardiac enlargement with mild pulmonary edema, watch for volume overload.  - No modifiable risk factors, patient at baseline other than oxygen as below.       Acute hypoxic respiratory failure  sats briefly dipped to 88% on room air after pain medications in ER.  No respiratory symptoms including no dyspnea or chest pain.  On admission sats good on 1LNC.   -Hypoxia resolved postoperatively     NICM (nonischemic cardiomyopathy) (H) with permanent pacemaker  Acute on chronic systolic heart failure  - outside echo from 1/19/23 showed LVEF 35-40%, mild concentric LVH, mildly reduced RV systolic function.  No significant valvular disease.    - continue home Jardiance, metoprolol and entresto   - Mild pulmonary edema on chest x-ray, saline lock IV fluids     CKD (chronic kidney disease) stage 3, GFR 30-59 ml/min (H)  Looks like baseline is about 1.6-1.7, ws 1.8 on admission.  No true CHASITY present on admission.  Gently hydrating as above.  Follow.        Thrombocytopenia  Slightly low on admission - follow.       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  Read as normal on most recent echo fro 1/2023.       Essential hypertension  Continue home metoprolol,      Paroxysmal atrial fibrillation on anticoagulation   - continue home metoprolol   - held xarelto, last dose was approximately dinnertime on 3/28.     - Resumed postoperatively after discussion with orthopedic surgery    Consultations This Hospital Stay   ORTHOPEDIC SURGERY IP CONSULT  HOSPITALIST IP CONSULT  CARE MANAGEMENT / SOCIAL WORK IP CONSULT  PHYSICAL THERAPY ADULT IP CONSULT  OCCUPATIONAL THERAPY ADULT IP CONSULT    Code Status   Full Code    Time Spent on this Encounter   I, Ruben Mcdaniel MD, personally saw the patient today and spent greater than 30 minutes discharging this patient.       Ruben Mcdaniel MD  [facility]  ______________________________________________________________________    Physical Exam   Vital Signs: Temp: 97.7  F (36.5  C) Temp src: Axillary BP: 104/64 Pulse: 61   Resp: 17 SpO2: 91 % O2 Device: None (Room air) Oxygen Delivery: 3 LPM  Weight: 173 lbs 0 oz       Gen: Well nourished, elderly male, alert and oriented x 3, no acute distressed  HEENT: Atraumatic, normocephalic; sclera non-injected, anicterric; oral mucosa moist, no lesion, no exudate  Lungs: Clear to ausculation, no wheezes, no rhonchi, no rales  Heart: Regular rate, regular rhythm, no gallops, no rubs, no murmurs  GI: Bowel sound normal, no hepatosplenomegaly, no masses, non-tender, non-distended, no guarding, no rebound tenderness  Lymph: No lymphadenopathy, no edema  Skin: No rashes, no chronic venous stasis     Primary Care Physician   Lokesh Tomas    Discharge Orders      Home Care Referral      Follow-up and recommended labs and tests     Follow up with Dr.Erik Cash's team for your right hip  replacement at 2 weeks post surgery. Call [facility] scheduling at [phone] to make an appointment. Call his team at [phone] for questions.     Activity    Your activity upon discharge:   1. No hip flexion past 90 degrees, no internal rotation of the hip, no adduction of the hip past neutral.   2. WBAT     Wound care and dressings    Instructions to care for your wound at home:   You have an Aquacel dressing on your incision. Leave this dressing in place until your first post op visit. It is ok to shower with the dressing uncovered; do not soak the area. If the dressing becomes loose or more than 50% saturated with drainage, contact your surgical team.     Reason for your hospital stay    This is an 82-year-old male admitted with a right femoral neck fracture, who underwent a right total hip arthroplasty on 3/30/2023.     Follow-up and recommended labs and tests    Follow up with primary care provider, Lokesh Tomas, within 7 days for hospital follow- up.  The following labs/tests are recommended: BMP and CBC.     [facility] D[facility]    DME Documentation: Describe the reason for need to support medical necessity: Impaired gait status post hip surgery. I, the undersigned, certify that the above prescribed supplies are medically necessary for this patient and is both reasonable and necessary in reference to accepted standards of medical practice in the treatment of this patient's condition and is not prescribed as a convenience.     [facility]    : DME Documentation: Describe the reason for need to support medical necessity: Impaired gait status post hip surgery. I, the undersigned, certify that the above prescribed supplies are medically necessary for this patient and is both reasonable and necessary in reference to accepted standards of medical practice in the treatment of this patient's condition and is not prescribed as a convenience.     Diet    Follow this diet upon discharge: Low fat and 2g salt  diet       Significant Results and Procedures   Most Recent 3 CBC's:Recent Labs   Lab Test 03/31/23  0540 03/30/23  0502 03/29/23  2137   WBC 10.1 7.2 10.3   HGB 15.7 15.4 16.5   MCV 95 93 92   * 120* 129*     Most Recent 3 BMP's:Recent Labs   Lab Test 03/31/23  0540 03/30/23  1239 03/30/23  0502 03/29/23  2137     --  140 140   POTASSIUM 5.0  --  4.8 4.8   CHLORIDE 105  --  106 104   CO2 18*  --  24 24   BUN 37.8*  --  33.6* 34.5*   CR 1.82*  --  1.64* 1.80*   ANIONGAP 17*  --  10 12   ELENI 9.0  --  8.9 9.4   * 105* 120* 114*   ,   Results for orders placed or performed during the hospital encounter of 03/29/23   XR Pelvis w Hip Right 1 View    Narrative    EXAM: XR PELVIS AND HIP RIGHT 1 VIEW  LOCATION: [facility]  DATE/TIME: 3/29/2023 6:44 PM    INDICATION: Right hip pain.  COMPARISON: None.      Impression    IMPRESSION: Acute mid cervical right femoral neck fracture. Normal hip joint alignment. Mild joint space narrowing in both hips without significant arthritic changes. Advanced degenerative changes in the lower lumbar spine and SI joints.   XR Chest Port 1 View    Narrative    EXAM: XR CHEST PORT 1 VIEW  LOCATION: [facility]  DATE/TIME: 3/29/2023 10:44 PM    INDICATION: Preop, new O2 requirement.  COMPARISON: Chest x-ray 2 views 10/06/2022 at 1445 hours.      Impression    IMPRESSION: Mild cardiac enlargement. Interval development of mild venous congestion. No consolidation, cavitation, suspicious adenopathy or pleural effusion on either side. Elevation of the right hemidiaphragm, unchanged. Left chest pacer, leads in the   heart, unchanged. Atherosclerotic thoracic aorta. Degenerative changes both shoulders and the spine. Previously seen monitoring leads have been removed.   XR Pelvis Port 1/2 Views    Narrative    EXAM: XR PELVIS PORT 1/2 VIEWS  LOCATION: [facility]  DATE/TIME: 3/30/2023 5:39  PM    INDICATION: Status post hip surgery.  COMPARISON: None.      Impression    IMPRESSION: Recent right total hip arthroplasty with adjacent gas. Excellent position and alignment of components. No evidence of complication or periprosthetic fracture. Extensive arterial calcifications.       Discharge Medications   Current Discharge Medication List      CONTINUE these medications which have NOT CHANGED    Details   Acetaminophen 325 MG CAPS Take 2 tablets by mouth every 6 hours as needed      CENTRUM SILVER OR TABS 1 TABLET BY MOUTH DAILY      empagliflozin (JARDIANCE) 10 MG TABS tablet Take 10 mg by mouth daily      Levocarnitine 500 MG TABS Take 500 mg by mouth daily      magnesium 250 MG tablet Take 250 mg by mouth daily      metoprolol succinate ER (TOPROL XL) 25 MG 24 hr tablet Take 1 tablet by mouth daily      rivaroxaban ANTICOAGULANT (XARELTO) 15 MG TABS tablet Take 15 mg by mouth daily (with dinner)      sacubitril-valsartan (ENTRESTO) 24-26 MG per tablet Take 1 tablet by mouth 2 times daily           Allergies   No Known Allergies

## 2023-03-31 NOTE — PROGRESS NOTES
"[facility] Progress Note      Post-operative Day: 1 Day Post-Op    Procedure(s):  Right total hip arthroplasty  Subjective:    Pt reports that his hip is sore but better than yesterday. He was able to walk with PT and he and his wife feel comfortable with him going home.     Chest pain, SOB:  No  Nausea, vomiting:  No  Lightheadedness, dizziness:  No  Neuro:  Patient denies new onset numbness or paresthesias      Objective:  Blood pressure 104/64, pulse 61, temperature 97.6  F (36.4  C), temperature source Axillary, resp. rate 17, height 1.905 m (6' 3\"), weight 78.5 kg (173 lb), SpO2 91 %.    Patient Vitals for the past 24 hrs:   BP Temp Temp src Pulse Resp SpO2 Height Weight   03/31/23 0900 104/64 -- -- 61 17 91 % -- --   03/31/23 0804 117/82 -- -- 60 -- -- -- --   03/31/23 0337 123/73 97.6  F (36.4  C) Axillary 62 17 99 % -- --   03/30/23 2300 94/64 97.2  F (36.2  C) Axillary 61 16 97 % -- --   03/30/23 2230 92/60 -- -- 61 -- -- -- --   03/30/23 2200 94/65 -- -- 61 -- -- -- --   03/30/23 2130 91/60 -- -- 61 -- 99 % -- --   03/30/23 2126 101/70 -- -- 60 -- -- -- --   03/30/23 2045 (!) 84/57 -- -- 61 -- -- -- --   03/30/23 2030 (!) 88/61 -- -- 61 -- -- -- --   03/30/23 2015 94/64 -- -- 60 -- -- -- --   03/30/23 2000 100/63 -- -- 60 -- -- -- --   03/30/23 1945 110/67 -- -- 60 -- -- -- --   03/30/23 1930 95/66 -- -- 60 -- 91 % -- --   03/30/23 1915 102/70 -- -- 60 12 94 % -- --   03/30/23 1910 103/65 -- -- 60 12 92 % -- --   03/30/23 1900 116/77 -- -- 60 18 96 % -- --   03/30/23 1845 101/76 97.6  F (36.4  C) Temporal 65 15 94 % -- --   03/30/23 1830 99/70 -- -- 60 19 92 % -- --   03/30/23 1815 98/69 -- -- 60 11 91 % -- --   03/30/23 1800 102/77 -- -- 60 14 96 % -- --   03/30/23 1745 103/77 -- -- 60 10 95 % -- --   03/30/23 1730 97/66 -- -- 62 13 96 % -- --   03/30/23 1721 (!) 85/57 -- -- 60 (!) 7 93 % -- --   03/30/23 1715 (!) 76/55 97.4  F (36.3  C) Temporal 66 (!) 9 91 % -- --   03/30/23 1358 -- -- -- " "-- -- 95 % -- --   03/30/23 1349 (!) 117/90 97.7  F (36.5  C) Tympanic 60 16 (!) 87 % 1.905 m (6' 3\") 78.5 kg (173 lb)       Wt Readings from Last 4 Encounters:   03/30/23 78.5 kg (173 lb)       Gen: A&O x 3. NAD. Up to the recliner.   Wound status: Covered, Aquacel is c/d/i.  Circulation, motion and sensation: Dorsiflexion/plantarflexion intact and equal bilaterally; distal lower extremity sensation is intact and equal bilaterally. Foot and toes are warm and well perfused.    Swelling: Mild  Calf tenderness: calves are soft and non-tender bilaterally     Pertinent Labs   Lab Results: personally reviewed.     Recent Labs   Lab Test 03/31/23  0540 03/30/23  0502 03/29/23  2137 03/29/23  1817   INR  --   --   --  1.19*   HGB 15.7 15.4 16.5 16.1   HCT 48.3 47.0 49.4 47.5   MCV 95 93 92 90   * 120* 129* 130*    140 140 140       Plan:   Continue current cares and rehabilitation.  Anticoagulation protocol: Resume chronic Xarelto  Pain medications: tylenol  Weight bearing status:  WBAT  Disposition:  Discharge per medicine, orthopedically stable for discharge today.              Report completed by:  Rick Wood PA-C  Date: 3/31/2023  Time: 10:47 AM   "

## 2023-03-31 NOTE — PROGRESS NOTES
Reason for Admission: Post op Right Hip- performed 3/30  Neuro: A&O x 4  Air: Still on Capnography- satting in the high 90's  Lungs: Clear and equal bilatteraly  Skin: Bruising to forearms bilaterally. Redness to buttocks  Cardiac: Soft BP's noted. Held Entresto per Dr. request.   LDAs: Saline locked  Fluids: Drinking fluids well. Urinating- did not need to straight cath.   Pain: Pt rated his pain at 1/10. Ice packs accepted.   Mobility: Pt walked in and out of room. Walked a total of approx 100ft. No c/o dizziness.

## 2023-03-31 NOTE — PROGRESS NOTES
Occupational Therapy     03/31/23 1000   Appointment Info   Signing Clinician's Name / Credentials (OT) Beatriz Widman, OTR/L   Living Environment   People in Home spouse   Current Living Arrangements house   Home Accessibility stairs to enter home;stairs within home   Number of Stairs, Main Entrance 2   Stair Railings, Main Entrance railings safe and in good condition   Number of Stairs, Within Home, Primary six   Stair Railings, Within Home, Primary railings safe and in good condition   Self-Care   Equipment Currently Used at Home cane, straight   Fall history within last six months yes   Number of times patient has fallen within last six months 1   Activity/Exercise/Self-Care Comment Pt independent with ADLs at baseline.   Instrumental Activities of Daily Living (IADL)   IADL Comments Pt reports independence with IADLs. Pt's wife typically prepares meals and he primarily completes yardwork, uses a  in the winter. Pt's wife will be driving/providing transportation, though she plans to return to part-time work as a  in a few weeks.   General Information   Onset of Illness/Injury or Date of Surgery 03/29/23   Referring Physician Gera Cash MD   Patient/Family Therapy Goal Statement (OT) To return home   Additional Occupational Profile Info/Pertinent History of Current Problem Liset Franklin is an 82 year-old male with past medical history significant for CHF nonischemic cardiomyopathy A-fib hypertension and CKD  who presents post-surgery for R total hip arthroplasty following a fall that resulted in a closed displaced fracture of the R femoral neck. Pt was trying to tie his shoe at a [employer] when he lost his balance and fell landing on his right hip.   Existing Precautions/Restrictions no known precautions/restrictions   Limitations/Impairments hearing   Right Upper Extremity (Weight-bearing Status) weight-bearing as tolerated (WBAT)   Cognitive Status Examination    Orientation Status orientation to person, place and time   Cognitive Status Comments Pt able to follow 1-2 step directions and conversation.   Bed Mobility   Bed Mobility supine-sit   Supine-Sit [place] (Bed Mobility) supervision   Comment (Bed Mobility) Pt completes supine<>sit with HOB slightly elevated SBA using L bed rail.   Transfers   Transfers sit-stand transfer   Sit-Stand Transfer   Sit-Stand [place] (Transfers) contact guard   Sit/Stand Transfer Comments Pt completes STS from EOB to FWW CGA with VC to push off bed rather than walker. Pt transfers to bedside chair with FWW CGA. No LOB or dizziness with transfers. Pt left in chair with chair alarm activated, call light and needs in reach, wife in room.   Clinical Impression   Criteria for Skilled Therapeutic Interventions Met (OT) Yes, treatment indicated   OT Diagnosis Decreased ADL independence   Influenced by the following impairments R total hip arthroplasty   OT Problem List-Impairments impacting ADL problems related to;range of motion (ROM);pain   Assessment of Occupational Performance 1-3 Performance Deficits   Identified Performance Deficits transfers, LB dressing   Planned Therapy Interventions (OT) ADL retraining   Clinical Decision Making Complexity (OT) low complexity   Risk & Benefits of therapy have been explained evaluation/treatment results reviewed;care plan/treatment goals reviewed;risks/benefits reviewed   OT Total Evaluation Time   OT Eval, Low Complexity Minutes (91260) 8   OT Goals   Therapy Frequency (OT) One time eval and treatment   OT Predicted Duration/Target Date for Goal Attainment 03/31/23   OT Goals Lower Body Dressing;Transfers   OT: Lower Body Dressing Modified independent;using adaptive equipment  (seated)   Self-Care/Home Management   Self-Care/Home Mgmt/ADL, Compensatory, Meal Prep Minutes (03614) 10   Symptoms Noted During/After Treatment (Meal Preparation/Planning Training) none   Treatment Detail/Skilled  Intervention Pt seated in chair with wife in room. Pt demonstrates ability to doff bilateral socks. Education (verbal and demo) provided on use of sock aide and reacher. Pt demonstrates understanding of sock aide use on BLE while seated. Both pt and wife state this would be an easy adaptation for LB dressing. Discussed use of stool for supporting feet donning/doffing shoes while seated, also discussed consideration of velcro shoes. Therapist emphasized importance of asking for help with I/ADLs and adapting tasks once home to prevent more falls. Pt and wife verbalize understanding.   OT Discharge Planning   OT Plan one time eval and treat   OT Discharge Recommendation (DC Rec) home;home with assist   OT Rationale for DC Rec Pt previously independent with I/ADLs living with wife who will be home to provide assist as needed. Pt and wife verbalize/demonstrate understanding of strategies for safe return home.   OT Brief overview of current status SBA bed mobility, LB dressing (with adaptive equip); CGA STS, ambulating in room   Total Session Time   Timed Code Treatment Minutes 10   Total Session Time (sum of timed and untimed services) 18

## 2023-03-31 NOTE — PROGRESS NOTES
03/31/23 0828   Appointment Info   Signing Clinician's Name / Credentials (PT) Jelena Ann, PT   Living Environment   People in Home spouse   Current Living Arrangements house   Home Accessibility stairs to enter home;stairs within home   Number of Stairs, Main Entrance 2   Stair Railings, Main Entrance railings safe and in good condition   Number of Stairs, Within Home, Primary six   Stair Railings, Within Home, Primary railings safe and in good condition   Living Environment Comments Bedroom, bathroom on lower level; kitchen and dining room on upper level   Self-Care   Equipment Currently Used at Home cane, straight   Fall history within last six months yes   Number of times patient has fallen within last six months 1   Activity/Exercise/Self-Care Comment Indep wtih mobility with cane in community, ADL's. Shares IADL's with spouse.   General Information   Onset of Illness/Injury or Date of Surgery 03/29/23   Referring Physician Gera Cash MD   Patient/Family Therapy Goals Statement (PT) Goal is return home   Pertinent History of Current Problem (include personal factors and/or comorbidities that impact the POC) 82 y.o. male admitted 3/29 due to fall when trying to tie his shoes resulting in R femoral neck fracture. Now s/p R hip bipolar hemiarthroplasty, WBAT without precautions.   Weight-Bearing Status - RLE weight-bearing as tolerated   Cognition   Affect/Mental Status (Cognition) WFL   Orientation Status (Cognition) oriented x 4   Follows Commands (Cognition) WFL   Pain Assessment   Patient Currently in Pain Yes, see Vital Sign flowsheet   Range of Motion (ROM)   Range of Motion ROM deficits secondary to pain;ROM deficits secondary to surgical procedure   Strength (Manual Muscle Testing)   Strength (Manual Muscle Testing) Able to perform L SLR;Able to perform R SLR   Strength Comments Pain in RLE   Bed Mobility   Comment, (Bed Mobility) supine<>sit with SBA   Transfers   Comment, (Transfers)  sit<>stand from EOB with SBA and 2WW   Gait/Stairs (Locomotion)   [place] Level (Gait) contact guard   Assistive Device (Gait) walker, front-wheeled   Distance in Feet 30   Distance in Feet (Gait) 150   Pattern (Gait) step-to   Deviations/Abnormal Patterns (Gait) antalgic;gait speed decreased;weight shifting decreased   Maintains Weight-bearing Status (Gait) able to maintain   Negotiation (Stairs) stairs independence;handrail location;number of steps;ascending technique;descending technique   [place] Level (Stairs) contact guard   Handrail Location (Stairs) both sides   Number of Steps (Stairs) 2   Ascending Technique (Stairs) step-to-step   Descending Technique (Stairs) step-to-step   Clinical Impression   Criteria for Skilled Therapeutic Intervention Yes, treatment indicated   PT Diagnosis (PT) impaired mobility s/p R hip fracture now s/p bipolar marguerite   Influenced by the following impairments pain, LE weakness, reduced mobility   Functional limitations due to impairments impaired bed mobility, transfers, gait, stairs   Clinical Presentation (PT Evaluation Complexity) Stable/Uncomplicated   Clinical Presentation Rationale clinical reasoning   Clinical Decision Making (Complexity) moderate complexity   Planned Therapy Interventions (PT) balance training;bed mobility training;gait training;home exercise program;patient/family education;stair training;strengthening;transfer training   Anticipated Equipment Needs at Discharge (PT) walker, standard  (pt unsure if he has walker, will need prior to discharge)   Risk & Benefits of therapy have been explained evaluation/treatment results reviewed;care plan/treatment goals reviewed;risks/benefits reviewed;current/potential barriers reviewed;participants voiced agreement with care plan;participants included;patient   PT Total Evaluation Time   PT Eval, Low Complexity Minutes (85061) 10   Physical Therapy Goals   PT Frequency One time eval and treatment only   PT  "Predicted Duration/Target Date for Goal Attainment 04/07/23   PT Goals Transfers;Gait;Stairs   PT: Transfers Supervision/stand-by assist;Bed to/from chair   PT: Gait Supervision/stand-by assist;Greater than 200 feet;Standard walker   PT: Stairs Supervision/stand-by assist;6 stairs;Rail on both sides   Interventions   Interventions Quick Adds Gait Training;Therapeutic Activity   Therapeutic Activity   Therapeutic Activities: dynamic activities to improve functional performance Minutes (84500) 10   Symptoms Noted During/After Treatment Increased pain;Fatigue   Treatment Detail/Skilled Intervention education on WBAT and safe transfer technique given recent surgery. pt verbalized understanding, educaiton on spouse may need to assist in leg in/out of bed with pt stating he will \"figure it out\" at home.   Gait Training   Gait Training Minutes (73782) 25   Symptoms Noted During/After Treatment (Gait Training) fatigue;increased pain   Treatment Detail/Skilled Intervention Amb x150 feet with 2WW and SBA, cues for step through pattern and wider base of support, good stability wtih ambulation, slower pace due to pain. up/down 2 stairs with HRA and SBA, cues for sequencing to reduce pain, good carryover. education on spouse managing walker up/down stairs for safety. discussed pt will need walker at home, pt thinks he has at home but will ask wife. discussed can obtain from home medical store or PT can assist in obtaining if he does not have   [place] Level (Gait Training) stand-by assist   Physical Assistance Level (Gait Training) verbal cues   Weight Bearing (Gait Training) weight-bearing as tolerated   Assistive Device (Gait Training) standard walker   Gait Analysis Deviations decreased gray;decreased stride length   Impairments (Gait Analysis/Training) pain;ROM decreased;strength decreased   Stair Railings present at both sides   Physical Assist/Nonphysical Assist (Stairs) verbal cues   Level of [place] (Stairs) " stand-by assist   PT Discharge Planning   PT Plan Fri 1/6; PT plan amb with 2WW, stairs, pt may need walker if spouse unable to find at home- wanted to check with wife before obtaining through insurance   PT Discharge Recommendation (DC Rec) home with home care physical therapy;home with assist   PT Rationale for DC Rec pt would benefit from home care PT to increase indep and safety in own environment   PT Brief overview of current status amb 180 feet with 2WW and SBA, up/down 2 stairs   Total Session Time   Timed Code Treatment Minutes 35   Total Session Time (sum of timed and untimed services) 45

## 2023-04-02 ENCOUNTER — PATIENT OUTREACH (OUTPATIENT)
Dept: CARE COORDINATION | Facility: CLINIC | Age: 83
End: 2023-04-02
Payer: COMMERCIAL

## 2023-04-02 NOTE — PROGRESS NOTES
Clinic Care Coordination Contact  [facility]: Post-Discharge Note  SITUATION                                                      Admission:    Admission Date: 03/29/23   Reason for Admission: Closed displaced fracture of right femoral neck (H)  Mechanical fall  Discharge:   Discharge Date: 03/31/23  Discharge Diagnosis: Closed displaced fracture of right femoral neck (H)  Mechanical fall    Surgery Clearance and RCRI Risk Assessment:   Acute hypoxic respiratory failure   NICM (nonischemic cardiomyopathy) (H) with permanent pacemaker  Acute on chronic systolic heart failure  CKD (chronic kidney disease) stage 3, GFR 30-59 ml/min (H)  Thrombocytopenia  Ascending aorta dilatation (H)  Essential hypertension  Paroxysmal atrial fibrillation on anticoagulation    BACKGROUND                                                      Per hospital discharge summary and inpatient provider notes:    Liset Franklin is a 82 year old male who was admitted on 3/29/2023 after a fall at "[employer] while trying to tie his shoes, resulting in a right femoral neck fracture    ASSESSMENT           Discharge Assessment  How are you doing now that you are home?: I'm getting around little by little  How are your symptoms? (Red Flag symptoms escalate to triage hotline per guidelines): Improved  Do you feel your condition is stable enough to be safe at home until your provider visit?: Yes  Does the patient have their discharge instructions? : Yes  Does the patient have questions regarding their discharge instructions? : No  Were you started on any new medications or were there changes to any of your previous medications? : No  Does the patient have all of their medications?: Yes  Do you have questions regarding any of your medications? : No  Do you have all of your needed medical supplies or equipment (DME)?  (i.e. oxygen tank, CPAP, cane, etc.): Yes (cane, walker)  Discharge follow-up appointment scheduled within 14 calendar days? :  No  Is patient agreeable to assistance with scheduling? : No (wife will call tomorrow to schedule follow up appointments with PCP and surgeon)         Post-op (Clinicians Only)  Did the patient have surgery or a procedure: Yes  Incision: closed  Drainage: No  Bleeding: none  Fever: No  Chills: No  Redness:  (unknown)  Warmth: No  Swelling: No  Incision site pain: Yes  Closure: suture  Eating & Drinking: eating and drinking without complaints/concerns  PO Intake: regular diet  Bowel Function:  (harder than usual)  Date of last BM: 04/02/23  Urinary Status: voiding without complaint/concerns    Patient improving slowly. Managing pain with ice pack and Tylenol. No further questions or concerns from patient or wife. 24/7 [facility] nurse triage phone number provided to patient's wife.       PLAN                                                      Outpatient Plan:  Follow up with primary care provider, Lokesh Tomas, within 7 days for   hospital follow- up.  The following labs/tests are recommended: BMP and   CBC.           Follow up with Dr.Erik Cash's team for your right hip replacement at   2 weeks post surgery. Call [facility] scheduling at   [phone] to make an appointment. Call his team at [phone] for   questions.       No future appointments.      For any urgent concerns, please contact our 24 hour nurse triage line: [phone] ([phone])         Jolie Cutler RN

## 2023-04-13 ENCOUNTER — TRANSFERRED RECORDS (OUTPATIENT)
Dept: HEALTH INFORMATION MANAGEMENT | Facility: CLINIC | Age: 83
End: 2023-04-13
Payer: COMMERCIAL

## 2023-04-28 ENCOUNTER — DOCUMENTATION ONLY (OUTPATIENT)
Dept: OTHER | Facility: CLINIC | Age: 83
End: 2023-04-28
Payer: COMMERCIAL

## 2023-05-18 ENCOUNTER — TRANSFERRED RECORDS (OUTPATIENT)
Dept: HEALTH INFORMATION MANAGEMENT | Facility: CLINIC | Age: 83
End: 2023-05-18
Payer: COMMERCIAL

## 2023-08-17 ENCOUNTER — TRANSFERRED RECORDS (OUTPATIENT)
Dept: HEALTH INFORMATION MANAGEMENT | Facility: CLINIC | Age: 83
End: 2023-08-17
Payer: COMMERCIAL

## (undated) DEVICE — BLADE SAW SAGITTAL STRK 18X90X1.37MM HD SYS 6 6118-137-090

## (undated) DEVICE — NDL 18GA 1.5" 305196

## (undated) DEVICE — SU MONOCRYL 2-0 CT-1 36" UND Y945H

## (undated) DEVICE — BLANKET BAIR HUGGER UPPER BODY 42268

## (undated) DEVICE — SUCTION MANIFOLD NEPTUNE 2 SYS 4 PORT 0702-020-000

## (undated) DEVICE — SU PDO 1 STRATAFIX 36X36CM CTX TAPERPOINT SXPD2B405

## (undated) DEVICE — ESU PENCIL SMOKE EVAC W/ROCKER SWITCH 0703-047-000

## (undated) DEVICE — DECANTER VIAL 2006S

## (undated) DEVICE — SU ETHIBOND 5 V-37 4X30" MB66G

## (undated) DEVICE — DRSG AQUACEL AG 3.5X12" HYDROFIBER 420670

## (undated) DEVICE — SYR 20ML LL W/O NDL

## (undated) DEVICE — GLOVE BIOGEL PI ULTRATOUCH G SZ 6.5 42165

## (undated) DEVICE — PREP CHLORAPREP 26ML TINTED ORANGE  260815

## (undated) DEVICE — SU VICRYL 0 CT-1 36" J946H

## (undated) DEVICE — GLOVE BIOGEL PI MICRO INDICATOR UNDERGLOVE SZ 6.5 48965

## (undated) DEVICE — SOL NACL 0.9% IRRIG 3000ML BAG 07972-08

## (undated) DEVICE — GLOVE BIOGEL PI MICRO INDICATOR UNDERGLOVE SZ 8.0 48980

## (undated) DEVICE — SYR BULB IRRIG 50ML LATEX FREE 0035280

## (undated) DEVICE — GLOVE BIOGEL PI ULTRATOUCH G SZ 8.0 42180

## (undated) DEVICE — SOL WATER IRRIG 1000ML BOTTLE 07139-09

## (undated) DEVICE — PACK TOTAL HIP W/POUCH RIVERSIDE LATEX FREE

## (undated) DEVICE — SUCTION IRR SYSTEM W/TIP INTERPULSE

## (undated) DEVICE — GOWN XLG DISP 9545

## (undated) DEVICE — SU STRATAFIX MONOCRYL 3-0 SPIRAL PS-2 30CM SXMP1B106

## (undated) DEVICE — DEVICE RETRIEVER HEWSON 71111579

## (undated) DEVICE — GOWN IMPERVIOUS SPECIALTY XLG/XLONG 32474

## (undated) DEVICE — BONE CLEANING TIP INTERPULSE  0210-010-000

## (undated) DEVICE — DRAPE SHEET REV FOLD 3/4 9349

## (undated) RX ORDER — CEFAZOLIN SODIUM/WATER 2 G/20 ML
SYRINGE (ML) INTRAVENOUS
Status: DISPENSED
Start: 2023-03-30

## (undated) RX ORDER — FENTANYL CITRATE 50 UG/ML
INJECTION, SOLUTION INTRAMUSCULAR; INTRAVENOUS
Status: DISPENSED
Start: 2023-03-30

## (undated) RX ORDER — PROPOFOL 10 MG/ML
INJECTION, EMULSION INTRAVENOUS
Status: DISPENSED
Start: 2023-03-30

## (undated) RX ORDER — ONDANSETRON 2 MG/ML
INJECTION INTRAMUSCULAR; INTRAVENOUS
Status: DISPENSED
Start: 2023-03-30

## (undated) RX ORDER — BUPIVACAINE HYDROCHLORIDE 5 MG/ML
INJECTION, SOLUTION EPIDURAL; INTRACAUDAL
Status: DISPENSED
Start: 2023-03-30

## (undated) RX ORDER — LIDOCAINE HYDROCHLORIDE 10 MG/ML
INJECTION, SOLUTION EPIDURAL; INFILTRATION; INTRACAUDAL; PERINEURAL
Status: DISPENSED
Start: 2023-03-30

## (undated) RX ORDER — FENTANYL CITRATE-0.9 % NACL/PF 10 MCG/ML
PLASTIC BAG, INJECTION (ML) INTRAVENOUS
Status: DISPENSED
Start: 2023-03-30

## (undated) RX ORDER — DEXAMETHASONE SODIUM PHOSPHATE 4 MG/ML
INJECTION, SOLUTION INTRA-ARTICULAR; INTRALESIONAL; INTRAMUSCULAR; INTRAVENOUS; SOFT TISSUE
Status: DISPENSED
Start: 2023-03-30

## (undated) RX ORDER — MAGNESIUM SULFATE HEPTAHYDRATE 40 MG/ML
INJECTION, SOLUTION INTRAVENOUS
Status: DISPENSED
Start: 2023-03-30

## (undated) RX ORDER — DEXMEDETOMIDINE HYDROCHLORIDE 100 UG/ML
INJECTION, SOLUTION INTRAVENOUS
Status: DISPENSED
Start: 2023-03-30

## (undated) RX ORDER — GLYCOPYRROLATE 0.2 MG/ML
INJECTION, SOLUTION INTRAMUSCULAR; INTRAVENOUS
Status: DISPENSED
Start: 2023-03-30